# Patient Record
Sex: MALE | Race: WHITE | Employment: OTHER | URBAN - METROPOLITAN AREA
[De-identification: names, ages, dates, MRNs, and addresses within clinical notes are randomized per-mention and may not be internally consistent; named-entity substitution may affect disease eponyms.]

---

## 2017-08-29 ENCOUNTER — HOSPITAL ENCOUNTER (INPATIENT)
Facility: CLINIC | Age: 27
LOS: 1 days | Discharge: HOME | DRG: 885 | End: 2017-08-31
Attending: EMERGENCY MEDICINE | Admitting: PSYCHIATRY & NEUROLOGY
Payer: MEDICARE

## 2017-08-29 DIAGNOSIS — Z79.899 DRUG THERAPY: ICD-10-CM

## 2017-08-29 DIAGNOSIS — R46.89 AGGRESSION: ICD-10-CM

## 2017-08-29 DIAGNOSIS — F25.9 SCHIZOAFFECTIVE DISORDER, UNSPECIFIED TYPE (H): ICD-10-CM

## 2017-08-29 PROCEDURE — 99285 EMERGENCY DEPT VISIT HI MDM: CPT | Mod: Z6 | Performed by: EMERGENCY MEDICINE

## 2017-08-29 PROCEDURE — 99285 EMERGENCY DEPT VISIT HI MDM: CPT | Mod: 25 | Performed by: EMERGENCY MEDICINE

## 2017-08-29 NOTE — IP AVS SNAPSHOT
MRN:[id number]                      After Visit Summary   8/29/2017    Cosmo Salas    MRN: [id number]           Thank you!     Thank you for choosing [facility] for your care. Our goal is always to provide you with excellent care.        Patient Information     Date Of Birth          1990        Designated Caregiver       Most Recent Value    Caregiver    Will someone help with your care after discharge? yes    Name of designated caregiver aquilino (mother)    Phone number of caregiver [phone]    Caregiver address [address] [address]      About your hospital stay     You were admitted on:  August 30, 2017 You last received care in the:  [facility]    You were discharged on:  August 31, 2017       Who to Call     For medical emergencies, please call 911.  For non-urgent questions about your medical care, please call your primary care provider or clinic, [phone]          Attending Provider     Provider Specialty    Chris Fuentes MD Emergency Medicine    Evan, Vinay Knapp MD Psychiatry       Primary Care Provider Office Phone # Fax #    [place] Mateo Tamayo [phone]      Further instructions from your care team        Behavioral Discharge Planning and Instructions      Summary:  You were admitted on 8/29/2017  due to Psychotic Symptomology.  You were treated by Dr Vinay Gordon MD and discharged on 8/31/17 from [facility]      Principal Diagnosis: Schizophrenia Disorder       Health Care Follow-up Appointments:     [facility]  2:40pm Dr Tamayo  Address: [address] [place] [address]  Phone: [phone] fax: [phone]    Recommendations for Dr Tamayo: (please consider tapering pt off wellbutrin)     You were offered an appointment to see at therapist but declined at this time.  You report that your outpatient psychiatrist was recommending a referral to IOP but you have not  "accepted this and will discuss it further with your provider.    Attend all scheduled appointments with your outpatient providers. Call at least 24 hours in advance if you need to reschedule an appointment to ensure continued access to your outpatient providers.   Major Treatments, Procedures and Findings:  You were provided with: a psychiatric assessment and medication evaluation and/or management    Symptoms to Report: feeling more aggressive, mood getting worse or increase in auditory hallucinations    Early warning signs can include: increased depression or anxiety increased unusual thinking, such as paranoia or hearing voices    Safety and Wellness:  Take all medicines as directed.  Make no changes unless your doctor suggests them.      Follow treatment recommendations.  Refrain from alcohol and non-prescribed drugs.  If there is a concern for safety, call 911.    Resources:   Crisis Intervention: 753.870.7980 or 877-411-9451 (TTY: 928.518.7072).  Call anytime for help.  National [place] on Mental Illness (www.[place].bobby.org): 752.450.2398 or 812-644-1060.  Suicide Awareness Voices of Education (SAVE) (www.save.org): 837-121-CYCB (9230)  National Suicide Prevention Line (www.mentalhealthmn.org): 393-879-HHKN (5241)  Mental Health Consumer/Survivor Network of [place] (www.mhcsn.net): 148.732.1960 or 226-351-7509  Mental Health Association of [place] (www.mentalhealth.org): 687.805.7749 or 478-315-0650  Self- Management and Recovery Training., SMART-- Toll free: 701.878.4137  www.Carvoyant.org  [facility] Crisis ([facility]) Response - Adult [phone]  Text 4 Life: txt \"LIFE\" to 37664 for immediate support and crisis intervention    The treatment team has appreciated the opportunity to work with you.     If you have any questions or concerns our unit number is [phone]  You may be receiving a follow-up phone call within the next three days from a representative from behavioral health.    You have identified the " "best phone number to reach you as [phone]          Pending Results     No orders found from 8/27/2017 to 8/30/2017.            Admission Information     Date & Time Provider Department Dept. Phone    8/29/2017 Vinay Gordon MD [facility] [phone]      Your Vitals Were     Blood Pressure Pulse Temperature Respirations Height Weight    131/85 76 98.2  F (36.8  C) 86 1.892 m (6' 2.5\") 126.7 kg (279 lb 4.8 oz)    Pulse Oximetry BMI (Body Mass Index)                96% 35.38 kg/m2          MyChart Information     [employer] gives you secure access to your electronic health record. If you see a primary care provider, you can also send messages to your care team and make appointments. If you have questions, please call your primary care clinic.  If you do not have a primary care provider, please call [phone] and they will assist you.        Care EveryWhere ID     This is your Care EveryWhere ID. This could be used by other organizations to access your [facility] medical records  [id number]        Equal Access to Services     [facility] : Hadii tarsha Lobo, walarissa miramontes, qablanquita barba, shannon maria. So [facility] [phone].    ATENCIÓN: Si habla español, tiene a hoffman disposición servicios gratuitos de asistencia lingüística. Llame al [phone].    We comply with applicable federal civil rights laws and [place] laws. We do not discriminate on the basis of race, color, national origin, age, disability sex, sexual orientation or gender identity.               Review of your medicines      START taking        Dose / Directions    OLANZapine 5 MG tablet   Commonly known as:  zyPREXA   Used for:  Schizoaffective disorder, unspecified type (H)        Dose:  5 mg   Take 1 tablet (5 mg) by mouth 3 times daily as needed (hallucinations, and increased anxiety due to instrucive auditory hallucinations)   Quantity:  90 tablet   Refills:  0         CONTINUE these " medicines which may have CHANGED, or have new prescriptions. If we are uncertain of the size of tablets/capsules you have at home, strength may be listed as something that might have changed.        Dose / Directions    ARIPiprazole 5 MG tablet   Commonly known as:  ABILIFY   This may have changed:  how much to take   Used for:  Schizoaffective disorder, unspecified type (H)        Dose:  25 mg   Take 5 tablets (25 mg) by mouth daily   Quantity:  150 tablet   Refills:  0       sertraline 50 MG tablet   Commonly known as:  ZOLOFT   This may have changed:    - medication strength  - how much to take   Used for:  Aggression, Schizoaffective disorder, unspecified type (H)        Dose:  150 mg   Take 3 tablets (150 mg) by mouth daily   Quantity:  90 tablet   Refills:  0         CONTINUE these medicines which have NOT CHANGED        Dose / Directions    LISINOPRIL PO        Dose:  5 mg   Take 5 mg by mouth   Refills:  0       METFORMIN HCL PO        Dose:  500 mg   Take 500 mg by mouth daily (with breakfast)   Refills:  0       multivitamin, therapeutic Tabs tablet        Dose:  1 tablet   Take 1 tablet by mouth daily   Refills:  0       OMEGA-3 FISH OIL PO        Dose:  1 g   Take 1 g by mouth daily   Refills:  0       TOPAMAX PO        Dose:  25 mg   Take 25 mg by mouth At Bedtime   Refills:  0       WELLBUTRIN XL PO        Dose:  150 mg   Take 150 mg by mouth   Refills:  0         STOP taking     Risperidone 4 MG Tbdp           risperiDONE microspheres 25 MG injection   Commonly known as:  risperDAL CONSTA                Where to get your medicines      These medications were sent to [facility] [place], [place] - [address]  [address] [facility] [address] [address]     Phone:  [phone]     ARIPiprazole 5 MG tablet    OLANZapine 5 MG tablet    sertraline 50 MG tablet                Protect others around you: Learn how to safely use, store and throw away your medicines at  www.disposemymeds.org.             Medication List: This is a list of all your medications and when to take them. Check marks below indicate your daily home schedule. Keep this list as a reference.      Medications           Morning Afternoon Evening Bedtime As Needed    ARIPiprazole 5 MG tablet   Commonly known as:  ABILIFY   Take 5 tablets (25 mg) by mouth daily   Last time this was given:  25 mg on 8/31/2017  8:21 AM                                LISINOPRIL PO   Take 5 mg by mouth   Last time this was given:  5 mg on 8/31/2017  8:21 AM                                METFORMIN HCL PO   Take 500 mg by mouth daily (with breakfast)                                multivitamin, therapeutic Tabs tablet   Take 1 tablet by mouth daily                                OLANZapine 5 MG tablet   Commonly known as:  zyPREXA   Take 1 tablet (5 mg) by mouth 3 times daily as needed (hallucinations, and increased anxiety due to instrucive auditory hallucinations)   Last time this was given:  10 mg on 8/30/2017  1:33 PM                                OMEGA-3 FISH OIL PO   Take 1 g by mouth daily                                sertraline 50 MG tablet   Commonly known as:  ZOLOFT   Take 3 tablets (150 mg) by mouth daily   Last time this was given:  150 mg on 8/31/2017  8:21 AM                                TOPAMAX PO   Take 25 mg by mouth At Bedtime                                WELLBUTRIN XL PO   Take 150 mg by mouth   Last time this was given:  150 mg on 8/31/2017  8:21 AM

## 2017-08-29 NOTE — IP AVS SNAPSHOT
[address]    [address]    [place] [address]    Phone:  [phone]                                       After Visit Summary   8/29/2017    Cosmo Salas    MRN: [id number]           After Visit Summary Signature Page     I have received my discharge instructions, and my questions have been answered. I have discussed any challenges I see with this plan with the nurse or doctor.    ..........................................................................................................................................  Patient/Patient Representative Signature      ..........................................................................................................................................  Patient Representative Print Name and Relationship to Patient    ..................................................               ................................................  Date                                            Time    ..........................................................................................................................................  Reviewed by Signature/Title    ...................................................              ..............................................  Date                                                            Time

## 2017-08-30 PROBLEM — R44.0 AUDITORY HALLUCINATIONS: Status: ACTIVE | Noted: 2017-08-30

## 2017-08-30 LAB
AMPHETAMINES UR QL SCN: NEGATIVE
BARBITURATES UR QL: NEGATIVE
BENZODIAZ UR QL: NEGATIVE
CANNABINOIDS UR QL SCN: NEGATIVE
COCAINE UR QL: NEGATIVE
ETHANOL UR QL SCN: NEGATIVE
OPIATES UR QL SCN: NEGATIVE

## 2017-08-30 PROCEDURE — 99222 1ST HOSP IP/OBS MODERATE 55: CPT | Mod: AI | Performed by: PSYCHIATRY & NEUROLOGY

## 2017-08-30 PROCEDURE — 25000132 ZZH RX MED GY IP 250 OP 250 PS 637: Performed by: EMERGENCY MEDICINE

## 2017-08-30 PROCEDURE — 12400007 ZZH R&B MH INTERMEDIATE UMMC

## 2017-08-30 PROCEDURE — 80320 DRUG SCREEN QUANTALCOHOLS: CPT | Performed by: EMERGENCY MEDICINE

## 2017-08-30 PROCEDURE — 80307 DRUG TEST PRSMV CHEM ANLYZR: CPT | Performed by: EMERGENCY MEDICINE

## 2017-08-30 PROCEDURE — 93005 ELECTROCARDIOGRAM TRACING: CPT | Performed by: EMERGENCY MEDICINE

## 2017-08-30 PROCEDURE — 90791 PSYCH DIAGNOSTIC EVALUATION: CPT

## 2017-08-30 PROCEDURE — 25000132 ZZH RX MED GY IP 250 OP 250 PS 637: Performed by: PSYCHIATRY & NEUROLOGY

## 2017-08-30 PROCEDURE — A9270 NON-COVERED ITEM OR SERVICE: HCPCS | Mod: GY | Performed by: EMERGENCY MEDICINE

## 2017-08-30 PROCEDURE — 25000132 ZZH RX MED GY IP 250 OP 250 PS 637: Mod: GY | Performed by: EMERGENCY MEDICINE

## 2017-08-30 RX ORDER — OLANZAPINE 10 MG/1
10 TABLET ORAL 3 TIMES DAILY PRN
Status: DISCONTINUED | OUTPATIENT
Start: 2017-08-30 | End: 2017-08-30 | Stop reason: ALTCHOICE

## 2017-08-30 RX ORDER — HALOPERIDOL 5 MG/1
5 TABLET ORAL 3 TIMES DAILY PRN
Status: DISCONTINUED | OUTPATIENT
Start: 2017-08-30 | End: 2017-08-30 | Stop reason: ALTCHOICE

## 2017-08-30 RX ORDER — OLANZAPINE 10 MG/2ML
10 INJECTION, POWDER, FOR SOLUTION INTRAMUSCULAR 3 TIMES DAILY PRN
Status: DISCONTINUED | OUTPATIENT
Start: 2017-08-30 | End: 2017-08-30 | Stop reason: ALTCHOICE

## 2017-08-30 RX ORDER — BUPROPION HYDROCHLORIDE 150 MG/1
150 TABLET ORAL DAILY
Status: DISCONTINUED | OUTPATIENT
Start: 2017-08-30 | End: 2017-08-31 | Stop reason: HOSPADM

## 2017-08-30 RX ORDER — SERTRALINE HYDROCHLORIDE 100 MG/1
100 TABLET, FILM COATED ORAL DAILY
Status: DISCONTINUED | OUTPATIENT
Start: 2017-08-30 | End: 2017-08-30

## 2017-08-30 RX ORDER — ARIPIPRAZOLE 10 MG/1
20 TABLET ORAL DAILY
Status: DISCONTINUED | OUTPATIENT
Start: 2017-08-30 | End: 2017-08-30

## 2017-08-30 RX ORDER — OLANZAPINE 5 MG/1
5 TABLET ORAL 3 TIMES DAILY PRN
Status: DISCONTINUED | OUTPATIENT
Start: 2017-08-30 | End: 2017-08-31 | Stop reason: HOSPADM

## 2017-08-30 RX ORDER — LISINOPRIL 2.5 MG/1
5 TABLET ORAL DAILY
Status: DISCONTINUED | OUTPATIENT
Start: 2017-08-30 | End: 2017-08-31 | Stop reason: HOSPADM

## 2017-08-30 RX ORDER — HYDROXYZINE HYDROCHLORIDE 25 MG/1
25-50 TABLET, FILM COATED ORAL EVERY 4 HOURS PRN
Status: DISCONTINUED | OUTPATIENT
Start: 2017-08-30 | End: 2017-08-31 | Stop reason: HOSPADM

## 2017-08-30 RX ORDER — METFORMIN HCL 500 MG
500 TABLET, EXTENDED RELEASE 24 HR ORAL
Status: DISCONTINUED | OUTPATIENT
Start: 2017-08-30 | End: 2017-08-31 | Stop reason: HOSPADM

## 2017-08-30 RX ADMIN — BUPROPION HYDROCHLORIDE 150 MG: 150 TABLET, FILM COATED, EXTENDED RELEASE ORAL at 08:06

## 2017-08-30 RX ADMIN — SERTRALINE HYDROCHLORIDE 100 MG: 100 TABLET, FILM COATED ORAL at 08:07

## 2017-08-30 RX ADMIN — OLANZAPINE 10 MG: 10 TABLET, FILM COATED ORAL at 13:33

## 2017-08-30 RX ADMIN — ARIPIPRAZOLE 20 MG: 20 TABLET ORAL at 08:06

## 2017-08-30 RX ADMIN — LISINOPRIL 5 MG: 2.5 TABLET ORAL at 08:07

## 2017-08-30 RX ADMIN — METFORMIN HYDROCHLORIDE 500 MG: 500 TABLET, EXTENDED RELEASE ORAL at 17:17

## 2017-08-30 RX ADMIN — SERTRALINE HYDROCHLORIDE 50 MG: 50 TABLET ORAL at 14:48

## 2017-08-30 ASSESSMENT — ACTIVITIES OF DAILY LIVING (ADL)
GROOMING: INDEPENDENT
FALL_HISTORY_WITHIN_LAST_SIX_MONTHS: NO
RETIRED_COMMUNICATION: 0-->UNDERSTANDS/COMMUNICATES WITHOUT DIFFICULTY
AMBULATION: 0-->INDEPENDENT
DRESS: INDEPENDENT
BATHING: 0-->INDEPENDENT
RETIRED_EATING: 0-->INDEPENDENT
TRANSFERRING: 0-->INDEPENDENT
LAUNDRY: WITH SUPERVISION
SWALLOWING: 0-->SWALLOWS FOODS/LIQUIDS WITHOUT DIFFICULTY
ORAL_HYGIENE: INDEPENDENT
TOILETING: 0-->INDEPENDENT
COGNITION: 0 - NO COGNITION ISSUES REPORTED
DRESS: 0-->INDEPENDENT

## 2017-08-30 NOTE — PROGRESS NOTES
Admitted male for increased psychotic symptoms, ie. auditory hallucinations.  Pt. has been medication compliant outside the hospital, has had a recent increase in medications.  Pt. lives at home with his mother.  Pt. states that currently he feels restless, somewhat agitated, but is not experiencing auditory hallucinations at this time.  Pt. Is a voluntary pt.

## 2017-08-30 NOTE — H&P
"INPATIENT PSYCHIATRIC HISTORY AND PHYSICAL    Patient: Cosmo Salas  MRN: [id number]      DATE OF ADMISSION:  08/30/2017      DATE OF SERVICE:  08/30/2017      CHIEF COMPLAINT: \"I'm been having voices bother me\"        HISTORY OF PRESENT ILLNESS:  Cosmo is a 27 year old male with history of schizophrenia who presents to the hospital at the behest of [facility] for evaluation and management of his intrusive auditory hallucinations. He endorses worsening auditory hallucinations over the past couple weeks. He mentions that the voices occur at all times of the day, and are intrusive to his daily functioning. The voices are usually persecutory and commanding, often telling him to harm himself or other, but does not listen to them. He is not involved in much activities during the day, although the voices intrude his ability to relax and take walks, which he enjoys doing, thus he has been remaining indoors mostly. He often finds himself having active conversations with the voices, mentioning that they are sometimes the only outlet of communication. He finds himself having more aggressive behaviors, but not to the point of damaging property or hurting anyone (he's admitted to punching a hole in the wall in the past due to his anger). He mentions that his mood has been more reclusive, and isolative lately. He denies any suicidal ideation/intent/plan, and also denies any homicidal ideation and/or plan. He had some racing thoughts in the form of memories which he can't tell are real or not, including being ambushed by the police, being arrested, etc. He also has some delusional thoughts about celebrities, and thoughts of \"the whole world being raped,\" and did not elaborate much. He endorses anxiety throughout the day, which is related to his hallucinations. He denies any visual hallucinations. Mentions his Abilify was increased from 10 mg to 20 mg 2 weeks ago, and has not had any intolerable side effects. Denies any illicit " "substance use or alcohol use recently.     PSYCHIATRIC ROS:     -- Depressive episode: Atmotivation, isolation, low moods.   -- Jennifer: Flight of ideas, irritabilty.   -- Psychosis: Auditory hallucinations, paranoia, delusions  -- Anxiety: Worry symptoms during the day.   -- PTSD: Sleep trouble  -- OCD: None  -- Eating disorder: Feels like he eats more when he's down.      PAST PSYCHIATRIC HISTORY:     Prior diagnoses: schizophrenia, schizoaffective disorder  Past hospitalizations:   11/2014 at [facility] for suicidal ideation and plan. Stabilized on Risperdal and plans to follow up with Day treatment  5/2014 at [facility] due to similar presentation to current  1/2014 at [facility] due to homicidal ideation and punching holes in walls due to paranoia that neighbors were spying on him  2012 at [facility]  Suicide attempts: denies  Self-injurious behavior: denies  Violence: denies, although has had thoughts of violence in the past.  States that he is \"a nonviolent person\".  ECT: none  Past medications:   Risperidone 2 mg BID  Olanzapine  Quetiapine  Aripiprazole  Geodon  Invega  Haloperidol --> EPSE  depakote - did not tolerate oral dose  lurasidone 80 mg   Hydroxyzine PRN  zolpidem  CoQ10      CURRENT PSYCHIATRIC MEDICATIONS:   Current Facility-Administered Medications   Medication     buPROPion (WELLBUTRIN XL) 24 hr tablet 150 mg     lisinopril (PRINIVIL/ZESTRIL) tablet 5 mg     metFORMIN (GLUCOPHAGE-XR) 24 hr tablet 500 mg     hydrOXYzine (ATARAX) tablet 25-50 mg     OLANZapine (zyPREXA) tablet 10 mg    Or     OLANZapine (zyPREXA) injection 10 mg     sertraline (ZOLOFT) tablet 50 mg     [START ON 8/31/2017] sertraline (ZOLOFT) tablet 150 mg     [START ON 8/31/2017] ARIPiprazole (ABILIFY) tablet 25 mg            ALLERGIES:   No Known Allergies        PAST MEDICAL HISTORY:      L humerous ORIF after fall in 3/2014    REVIEW OF SYSTEMS:  Some sedation, fatigue. Otherwise, the Review of Systems is negative other than noted in the " "HPI      SUBSTANCE HISTORY:    Nicotine: never  Alcohol: Occational use, no habitual use.   Cannabis: none  Others: none    FAMILY PSYCHIATRIC HISTORY:  Possible MI in sister, unknown      SOCIAL HISTORY:    Upbringing: Parents  when he was in middle school, recently living with his mother in [place]. 2 sisters and a brother.  Relationships/Current Living Situation: lives with his mother  Education/Occupation:  Did not graduate HS but was there through 12th grade. Worked at [employer] as an  per chart review (5/2014).   Legal History: none  Abuse History: unknown      PHYSICAL EXAMINATION:  Exam was done by Chris Fuentes MD on 8/29/2017 (please refer to that note)      CURRENT VITAL SIGNS:  Vital signs:  Temp: 98.6  F (37  C) Temp src: Oral BP: 120/80 Pulse: 78 Heart Rate: 83 Resp: 16 SpO2: 96 % O2 Device: None (Room air)   Height: 189.2 cm (6' 2.5\") Weight: 126.7 kg (279 lb 4.8 oz)  Estimated body mass index is 35.38 kg/(m^2) as calculated from the following:    Height as of this encounter: 1.892 m (6' 2.5\").    Weight as of this encounter: 126.7 kg (279 lb 4.8 oz).            MENTAL STATUS EXAMINATION:      Appearance:  Tall white male, large build. Shaved head. Adequate hygiene. Laying in bed, no acute distress.   Attitude: calm, pleasant, cooperative   Eye Contact: adequate   Mood:  \"Ok right now\"   Affect:  Calm, mood congruent   Speech:   Soft in tone, regular in rate   Psychomotor Behavior: Unremarkable   Thought Process:  Linear, coherent  Associations: No loosening  Thought Content: Endorses AH, commanding type, and some persecutory. No VH. No SI/HI.   Insight:   Fair   Judgment: Fair   Oriented to:  Person, place , time   Attention Span and Concentration: Adequate for interview   Recent and Remote Memory: Adequate   Language: Intact   Fund of Knowledge: Intact   Muscle Strength and Tone: Normal  Gait and Station: Normal       DIAGNOSES:   Schizophrenia Disorder     ASSESSMENT: "   Cosmo is a 27 year old male with history of Schizophrenia who was admitted on a voluntary basis for further management for his worsening auditory hallucinations. He appears to have a reemergence/exacerbation of some paranoia and delusional content as well. He has tolerated Abilify increase recently (which was done by his outpatient provider), and discussed increasing that dose further, which he was agreeable with. His intrusive, emotionally salient memories lead him to distress and anxiety (and seem to precipitate some of the AH), thus might be mitigated by optimizing his Zoloft dose. Regarding his Wellbutrin, he might fair better with eventual decrease of this dose, since it might be activating for him, and worsening his anxiety.       PLAN:   1.)Psychosis:  - Increase Abilify to 25 mg (starting tmw)  - Increase Zoloft to 150 mg (starting today)  - Zyprexa 5 mg TID PRN for hallucination     -Labs: CBC,BMP, LFT.     Legal: Voluntary    Vinay Gordon MD    Spent  55  minutes on encounter, >50% of which was spent in counseling and/or coordination of care, consisting of history taking, symptoms overview, discussion about medications/side effects, and risks and benefits of medications, and answered any questions the patient had.

## 2017-08-30 NOTE — ED PROVIDER NOTES
"  History     Chief Complaint   Patient presents with     Hallucinations     hearing voices, racing thoughts and \"making connections\", pt says he is having trouble thinking. Recent med changes.     HPI  Cosmo Salas is a 27 year old male with a history of schizophrenia who presents with auditory hallucinations.  Cosmo tells me that he has been having racing thoughts and has had trouble thinking due to his voices.  His medications were recently changed and his Abilify was increased.  He has felt frustrated due to his auditory hallucinations and he has been increasingly aggressive.  He has thrown some things in the house and has at times appeared very aggressive.  Primary drinks occasional alcohol but denies any other drug use.  He denies any suicidal ideation.      PAST MEDICAL HISTORY:   Past Medical History:   Diagnosis Date     Schizencephaly (H)        PAST SURGICAL HISTORY:   Past Surgical History:   Procedure Laterality Date     OPEN REDUCTION INTERNAL FIXATION HUMERUS PROXIMAL Left        FAMILY HISTORY: No family history on file.    SOCIAL HISTORY:   Social History   Substance Use Topics     Smoking status: Never Smoker     Smokeless tobacco: Never Used     Alcohol use Yes      Comment: occassional       Patient's Medications   New Prescriptions    No medications on file   Previous Medications    MULTIVITAMIN, THERAPEUTIC (THERA-VIT) TABS    Take 1 tablet by mouth daily    OMEGA-3 FATTY ACIDS (OMEGA-3 FISH OIL PO)    Take 1 g by mouth daily    RISPERIDONE 4 MG TBDP    Take 4mg at bedtime for the next 3 weeks, then 3mg at bedtime for 1 week, then 2mg at bedtime for 1 week, then 1mg at bedtime for 1 week, then discontinue.    RISPERIDONE MICROSPHERES (RISPERDAL CONSTA) 25 MG INJECTION    Inject 2 mLs (25 mg) into the muscle every 14 days   Modified Medications    No medications on file   Discontinued Medications    No medications on file        No Known Allergies    I have reviewed the Medications, Allergies, " Past Medical and Surgical History, and Social History in the Epic system.    Review of Systems   All other systems reviewed and are negative.      Physical Exam   BP: (!) 142/96  Pulse: 84  Temp: 97.6  F (36.4  C)  Resp: 16  SpO2: 94 %  Physical Exam   Constitutional: He is oriented to person, place, and time. No distress.   HENT:   Head: Normocephalic and atraumatic.   Right Ear: External ear normal.   Left Ear: External ear normal.   Mouth/Throat: Oropharynx is clear and moist. No oropharyngeal exudate.   Eyes: Conjunctivae are normal. Pupils are equal, round, and reactive to light.   Neck: Normal range of motion. Neck supple.   Cardiovascular: Normal rate and intact distal pulses.    Pulmonary/Chest: Effort normal. No respiratory distress. He has no wheezes. He has no rales. He exhibits no tenderness.   Abdominal: Soft. There is no tenderness. There is no rebound and no guarding.   Musculoskeletal: Normal range of motion. He exhibits no edema or tenderness.   Neurological: He is alert and oriented to person, place, and time. No cranial nerve deficit. He exhibits normal muscle tone. Coordination normal.   Skin: Skin is warm and dry. No rash noted. He is not diaphoretic.   Psychiatric: He has a normal mood and affect. His behavior is normal. Thought content normal.   Nursing note and vitals reviewed.      ED Course     ED Course     Procedures             Critical Care time:  none             Labs Ordered and Resulted from Time of ED Arrival Up to the Time of Departure from the ED - No data to display         Assessments & Plan (with Medical Decision Making)   1.  Psychosis  2.  Agression    27-year-old male with schizophrenia with acute psychosis.  He is having increasing auditory hallucinations with increasing aggression.  I'm concerned for his increasing aggression despite his medication adjustment. He agrees to be admitted to psychiatry for further evaluation.        I have reviewed the nursing notes.    I have  reviewed the findings, diagnosis, plan and need for follow up with the patient.    New Prescriptions    No medications on file       Final diagnoses:   None       8/29/2017   [facility], [place], EMERGENCY DEPARTMENT     Chris Fuentes MD  08/30/17 0849

## 2017-08-30 NOTE — PROGRESS NOTES
08/30/17 1456   Patient Belongings   Did you bring any home meds/supplements to the hospital?  No   Patient Belongings cell phone/electronics;clothing;shoes;wallet;other (see comments)   Disposition of Belongings Patient locker and security   Belongings Search Yes   Clothing Search Yes   Second Staff Bryant     Belongings in Pt Locker: 6 pair of black socks, 4 pair of briefs, 6 shirts (2 white, 1 brown, 3 black), 3 pair of black shorts (ALL WITH STRING), 1 brown sweatshirt (with string), 1 pair of blue jeans, 1 pair of black shoes, 1 belt, headphones, phone , 1 iPhone, and wallet.    Belongings sent to Security: [employer] card - ending in [id number], [employer] Express card - [id number], Discover card - 0500, [employer] Visa card - 2552, [place] Visa debit card - 0932, [employer] Rewards Visa card - 8341, [employer] store card (account card).    A               Admission:  I am responsible for any personal items that are not sent to the safe or pharmacy.  [facility] is not responsible for loss, theft or damage of any property in my possession.    Signature:  _________________________________ Date: _______  Time: _____                                              Staff Signature:  ____________________________ Date: ________  Time: _____      2nd Staff person, if patient is unable/unwilling to sign:    Signature: ________________________________ Date: ________  Time: _____     Discharge:  [facility] has returned all of my personal belongings:    Signature: _________________________________ Date: ________  Time: _____                                          Staff Signature:  ____________________________ Date: ________  Time: _____

## 2017-08-31 VITALS
HEART RATE: 76 BPM | WEIGHT: 279.3 LBS | DIASTOLIC BLOOD PRESSURE: 85 MMHG | BODY MASS INDEX: 34.73 KG/M2 | TEMPERATURE: 98.2 F | OXYGEN SATURATION: 96 % | SYSTOLIC BLOOD PRESSURE: 131 MMHG | RESPIRATION RATE: 86 BRPM | HEIGHT: 75 IN

## 2017-08-31 LAB
ALBUMIN SERPL-MCNC: 4 G/DL (ref 3.4–5)
ALP SERPL-CCNC: 108 U/L (ref 40–150)
ALT SERPL W P-5'-P-CCNC: 67 U/L (ref 0–70)
ANION GAP SERPL CALCULATED.3IONS-SCNC: 8 MMOL/L (ref 3–14)
AST SERPL W P-5'-P-CCNC: 40 U/L (ref 0–45)
BASOPHILS # BLD AUTO: 0 10E9/L (ref 0–0.2)
BASOPHILS NFR BLD AUTO: 0.3 %
BILIRUB DIRECT SERPL-MCNC: 0.1 MG/DL (ref 0–0.2)
BILIRUB SERPL-MCNC: 0.4 MG/DL (ref 0.2–1.3)
BUN SERPL-MCNC: 15 MG/DL (ref 7–30)
CALCIUM SERPL-MCNC: 8.6 MG/DL (ref 8.5–10.1)
CHLORIDE SERPL-SCNC: 106 MMOL/L (ref 94–109)
CO2 SERPL-SCNC: 28 MMOL/L (ref 20–32)
CREAT SERPL-MCNC: 1.04 MG/DL (ref 0.66–1.25)
DIFFERENTIAL METHOD BLD: NORMAL
EOSINOPHIL # BLD AUTO: 0.3 10E9/L (ref 0–0.7)
EOSINOPHIL NFR BLD AUTO: 4.4 %
ERYTHROCYTE [DISTWIDTH] IN BLOOD BY AUTOMATED COUNT: 13.8 % (ref 10–15)
GFR SERPL CREATININE-BSD FRML MDRD: 84 ML/MIN/1.7M2
GLUCOSE SERPL-MCNC: 117 MG/DL (ref 70–99)
HCT VFR BLD AUTO: 49.4 % (ref 40–53)
HGB BLD-MCNC: 16.9 G/DL (ref 13.3–17.7)
IMM GRANULOCYTES # BLD: 0 10E9/L (ref 0–0.4)
IMM GRANULOCYTES NFR BLD: 0.3 %
LYMPHOCYTES # BLD AUTO: 1.7 10E9/L (ref 0.8–5.3)
LYMPHOCYTES NFR BLD AUTO: 26.9 %
MCH RBC QN AUTO: 31.2 PG (ref 26.5–33)
MCHC RBC AUTO-ENTMCNC: 34.2 G/DL (ref 31.5–36.5)
MCV RBC AUTO: 91 FL (ref 78–100)
MONOCYTES # BLD AUTO: 0.6 10E9/L (ref 0–1.3)
MONOCYTES NFR BLD AUTO: 10.1 %
NEUTROPHILS # BLD AUTO: 3.7 10E9/L (ref 1.6–8.3)
NEUTROPHILS NFR BLD AUTO: 58 %
NRBC # BLD AUTO: 0 10*3/UL
NRBC BLD AUTO-RTO: 0 /100
PLATELET # BLD AUTO: 218 10E9/L (ref 150–450)
POTASSIUM SERPL-SCNC: 4.4 MMOL/L (ref 3.4–5.3)
PROT SERPL-MCNC: 8 G/DL (ref 6.8–8.8)
RBC # BLD AUTO: 5.41 10E12/L (ref 4.4–5.9)
SODIUM SERPL-SCNC: 142 MMOL/L (ref 133–144)
WBC # BLD AUTO: 6.3 10E9/L (ref 4–11)

## 2017-08-31 PROCEDURE — A9270 NON-COVERED ITEM OR SERVICE: HCPCS | Mod: GY | Performed by: EMERGENCY MEDICINE

## 2017-08-31 PROCEDURE — 25000132 ZZH RX MED GY IP 250 OP 250 PS 637: Mod: GY | Performed by: PSYCHIATRY & NEUROLOGY

## 2017-08-31 PROCEDURE — 85025 COMPLETE CBC W/AUTO DIFF WBC: CPT | Performed by: PSYCHIATRY & NEUROLOGY

## 2017-08-31 PROCEDURE — 80076 HEPATIC FUNCTION PANEL: CPT | Performed by: PSYCHIATRY & NEUROLOGY

## 2017-08-31 PROCEDURE — 36415 COLL VENOUS BLD VENIPUNCTURE: CPT | Performed by: PSYCHIATRY & NEUROLOGY

## 2017-08-31 PROCEDURE — 25000132 ZZH RX MED GY IP 250 OP 250 PS 637: Mod: GY | Performed by: EMERGENCY MEDICINE

## 2017-08-31 PROCEDURE — A9270 NON-COVERED ITEM OR SERVICE: HCPCS | Mod: GY | Performed by: PSYCHIATRY & NEUROLOGY

## 2017-08-31 PROCEDURE — 80048 BASIC METABOLIC PNL TOTAL CA: CPT | Performed by: PSYCHIATRY & NEUROLOGY

## 2017-08-31 PROCEDURE — 99238 HOSP IP/OBS DSCHRG MGMT 30/<: CPT | Performed by: PSYCHIATRY & NEUROLOGY

## 2017-08-31 RX ORDER — OLANZAPINE 5 MG/1
5 TABLET ORAL 3 TIMES DAILY PRN
Qty: 90 TABLET | Refills: 0 | Status: ON HOLD | OUTPATIENT
Start: 2017-08-31 | End: 2018-06-01

## 2017-08-31 RX ORDER — ARIPIPRAZOLE 5 MG/1
25 TABLET ORAL DAILY
Qty: 150 TABLET | Refills: 0 | Status: ON HOLD | OUTPATIENT
Start: 2017-08-31 | End: 2018-06-01

## 2017-08-31 RX ADMIN — ARIPIPRAZOLE 25 MG: 15 TABLET ORAL at 08:21

## 2017-08-31 RX ADMIN — SERTRALINE HYDROCHLORIDE 150 MG: 50 TABLET ORAL at 08:21

## 2017-08-31 RX ADMIN — LISINOPRIL 5 MG: 2.5 TABLET ORAL at 08:21

## 2017-08-31 RX ADMIN — BUPROPION HYDROCHLORIDE 150 MG: 150 TABLET, FILM COATED, EXTENDED RELEASE ORAL at 08:21

## 2017-08-31 ASSESSMENT — ACTIVITIES OF DAILY LIVING (ADL)
GROOMING: INDEPENDENT
DRESS: INDEPENDENT
LAUNDRY: WITH SUPERVISION
ORAL_HYGIENE: INDEPENDENT

## 2017-08-31 NOTE — DISCHARGE SUMMARY
[facility], [place]   Psychiatric Discharge Summary      Cosmo Salas MRN# [id number]   Age: 27 year old YOB: 1990     Date of Admission:  8/29/2017  Date of Discharge:  08/31/17  Admitting Physician:  Vinay Gordon MD  Discharge Physician:  Vinay Gordon MD         Summary/Hospital Course/Disposition:   Summary: Cosmo is a 27 year old male with history of Schizophrenia who was admitted on a voluntary basis for further management for his worsening auditory hallucinations. He appears to have a reemergence/exacerbation of some paranoia and delusional content as well. His voices are frequently present throughout the day, and he is usually able to distract himself with activities/music, but they have been getting more intense, and had been triggered by intense/trauma associated memories. It was unclear if his intrusive memories were real or confabulated, since the patient could not discern if they were corroborated in truth himself. He had a recent change in Abilify 2 weeks ago by this outpatient provider (10 mg to 20 mg). He seemed to tolerate his Abilify, thus we discussed making a slight increase in that from 20 mg to 25 mg, which he was in agreement with. Due to his intrusive trauma memories seemingly precipitating some of the AH to return, I increased the Zoloft as well (from 100 mg to 150 mg), which he was in agreement with. We also discussed the possibility of Wellbutrin contributing to some heightening of his voices. A recommendation for his outpatient provider could be to possibly reduce (or taper off) on the Wellbutrin. His Wellbutrin was not changed due to changes already in his Zoloft and Abilify. Zyprexa was added at 5 mg TID PRN, only to be used during periods of heightened anxiety due to his AH. He was calm, cooperative, and pleasant during his hospital stay. He did not appear to actively respond to internal stimuli. He did endorse having intrusive  memories and some AH while hospitalized but was less intense and less frequent has before. CBC, BMP, and LFT was ordered (since the last record is from 2014) ,which was within range. He requested to leave, and since he has improvement in his positive symptoms, denied any suicidal or homicidal ideation/intent/plan, thus was not holdable and discharged him back home.     Med Changes: Zoloft increased from 100 mg to 150 mg. Abilify increased from 20 mg to 25 mg.     Disposition: Patient will return home, where he lives with his mother. He plans to have a follow up appointment soon with [facility] with his psychiatrist.          DIagnoses:   Schizophrenia Disorder          Labs:     Recent Results (from the past 24 hour(s))   CBC with platelets differential    Collection Time: 08/31/17  7:51 AM   Result Value Ref Range    WBC 6.3 4.0 - 11.0 10e9/L    RBC Count 5.41 4.4 - 5.9 10e12/L    Hemoglobin 16.9 13.3 - 17.7 g/dL    Hematocrit 49.4 40.0 - 53.0 %    MCV 91 78 - 100 fl    MCH 31.2 26.5 - 33.0 pg    MCHC 34.2 31.5 - 36.5 g/dL    RDW 13.8 10.0 - 15.0 %    Platelet Count 218 150 - 450 10e9/L    Diff Method Automated Method     % Neutrophils 58.0 %    % Lymphocytes 26.9 %    % Monocytes 10.1 %    % Eosinophils 4.4 %    % Basophils 0.3 %    % Immature Granulocytes 0.3 %    Nucleated RBCs 0 0 /100    Absolute Neutrophil 3.7 1.6 - 8.3 10e9/L    Absolute Lymphocytes 1.7 0.8 - 5.3 10e9/L    Absolute Monocytes 0.6 0.0 - 1.3 10e9/L    Absolute Eosinophils 0.3 0.0 - 0.7 10e9/L    Absolute Basophils 0.0 0.0 - 0.2 10e9/L    Abs Immature Granulocytes 0.0 0 - 0.4 10e9/L    Absolute Nucleated RBC 0.0    Basic metabolic panel    Collection Time: 08/31/17  7:51 AM   Result Value Ref Range    Sodium 142 133 - 144 mmol/L    Potassium 4.4 3.4 - 5.3 mmol/L    Chloride 106 94 - 109 mmol/L    Carbon Dioxide 28 20 - 32 mmol/L    Anion Gap 8 3 - 14 mmol/L    Glucose 117 (H) 70 - 99 mg/dL    Urea Nitrogen 15 7 - 30 mg/dL    Creatinine 1.04  0.66 - 1.25 mg/dL    GFR Estimate 84 >60 mL/min/1.7m2    GFR Estimate If Black >90 >60 mL/min/1.7m2    Calcium 8.6 8.5 - 10.1 mg/dL   Hepatic panel    Collection Time: 08/31/17  7:51 AM   Result Value Ref Range    Bilirubin Direct 0.1 0.0 - 0.2 mg/dL    Bilirubin Total 0.4 0.2 - 1.3 mg/dL    Albumin 4.0 3.4 - 5.0 g/dL    Protein Total 8.0 6.8 - 8.8 g/dL    Alkaline Phosphatase 108 40 - 150 U/L    ALT 67 0 - 70 U/L    AST 40 0 - 45 U/L            Consults:   None            Discharge Medications:        Review of your medicines      START taking       Dose / Directions    OLANZapine 5 MG tablet   Commonly known as:  zyPREXA   Used for:  Schizoaffective disorder, unspecified type (H)        Dose:  5 mg   Take 1 tablet (5 mg) by mouth 3 times daily as needed (hallucinations, and increased anxiety due to instrucive auditory hallucinations)   Quantity:  90 tablet   Refills:  0         CONTINUE these medicines which may have CHANGED, or have new prescriptions. If we are uncertain of the size of tablets/capsules you have at home, strength may be listed as something that might have changed.       Dose / Directions    ARIPiprazole 5 MG tablet   Commonly known as:  ABILIFY   This may have changed:  how much to take   Used for:  Schizoaffective disorder, unspecified type (H)        Dose:  25 mg   Take 5 tablets (25 mg) by mouth daily   Quantity:  150 tablet   Refills:  0       sertraline 50 MG tablet   Commonly known as:  ZOLOFT   This may have changed:    - medication strength  - how much to take   Used for:  Aggression, Schizoaffective disorder, unspecified type (H)        Dose:  150 mg   Take 3 tablets (150 mg) by mouth daily   Quantity:  90 tablet   Refills:  0         CONTINUE these medicines which have NOT CHANGED       Dose / Directions    LISINOPRIL PO        Dose:  5 mg   Take 5 mg by mouth   Refills:  0       METFORMIN HCL PO        Dose:  500 mg   Take 500 mg by mouth daily (with breakfast)   Refills:  0        "multivitamin, therapeutic Tabs tablet        Dose:  1 tablet   Take 1 tablet by mouth daily   Refills:  0       OMEGA-3 FISH OIL PO        Dose:  1 g   Take 1 g by mouth daily   Refills:  0       TOPAMAX PO        Dose:  25 mg   Take 25 mg by mouth At Bedtime   Refills:  0       WELLBUTRIN XL PO        Dose:  150 mg   Take 150 mg by mouth   Refills:  0         STOP taking          Risperidone 4 MG Tbdp           risperiDONE microspheres 25 MG injection   Commonly known as:  risperDAL CONSTA                Where to get your medicines      These medications were sent to [facility] [place], [place] - [address]  [address], [facility] [address]     Phone:  [phone]      ARIPiprazole 5 MG tablet     OLANZapine 5 MG tablet     sertraline 50 MG tablet                  Mental Status Examination:   Appearance:  Tall white male, large build. Shaved head. Adequate hygiene. Laying in bed, no acute distress.   Attitude: calm, pleasant, cooperative   Eye Contact: adequate   Mood:  \"Ok right now\"   Affect:  Calm, mood congruent   Speech:   Soft in tone, regular in rate   Psychomotor Behavior: Unremarkable   Thought Process:  Linear, coherent  Associations: No loosening  Thought Content: Endorses some minor AH. No VH. No SI/HI.   Insight:   Fair   Judgment: Fair   Oriented to:  Person, place , time   Attention Span and Concentration: Adequate for interview   Recent and Remote Memory: Adequate   Language: Intact   Fund of Knowledge: Intact   Muscle Strength and Tone: Normal  Gait and Station: Normal          Discharge Plan:   No discharge procedures on file.    1.) Discharged on the medications mentioned in \"Summary/Hospital Course\"  2. ) Discharge back home, and will follow up with his outpatient provider at [facility].     Vinay Gordon MD  [facility]    Spent  20  minutes on encounter, >50% of which was spent in counseling and/or coordination of care, " consisting of discussion of symptoms, progress, medication changes, side effects, and discharge issues. Answered any questions he had about medication changes.

## 2017-08-31 NOTE — PROGRESS NOTES
Pt isolative to room all shift, came out for dinner. No SI, SIB stated/observed.     08/30/17 4260   Behavioral Health   Hallucinations denies / not responding to hallucinations   Thinking poor concentration   Orientation person: oriented;place: oriented   Memory baseline memory   Insight poor   Judgement impaired   Eye Contact at examiner   Affect blunted, flat   Mood other (see comments)  (MORELIA)   Physical Appearance/Attire attire appropriate to age and situation   Hygiene neglected grooming - unclean body, hair, teeth   Suicidality other (see comments)  (none stated/observed)   Self Injury other (see comment)  (none stated/observed)   Elopement (N/A)   Activity withdrawn;isolative   Speech clear;coherent   Medication Sensitivity no stated side effects;no observed side effects   Psychomotor / Gait balanced;steady   Psycho Education   Type of Intervention 1:1 intervention   Response observes from a distance   Activities of Daily Living   Hygiene/Grooming independent   Oral Hygiene independent   Dress independent   Laundry with supervision   Room Organization independent   Behavioral Health Interventions   Psychotic Symptoms maintain safety precautions;monitor need to revise level of observation;maintain safe secure environment;reality orientation;simple, clear language;decrease environmental stimulation;redirection of intrusive behaviors;redirection of aggressive behaviors;encourage nutrition and hydration;encourage participation / independence with adls;provide emotional support;establish therapeutic relationship;build upon strengths;monitor need for prn medication;monitor confusion, memory loss, decision making ability and reorient / intervent as needed   Social and Therapeutic Interventions (Psychotic Symptoms) encourage socialization with peers;encourage effective boundaries with peers;encourage participation in therapeutic groups and milieu activities

## 2017-08-31 NOTE — PROGRESS NOTES
INITIAL PSYCHOSOCIAL ASSESSMENT AND NOTE  I have reviewed the chart met with the patient, and developed Care Plan.  Information for assessment was obtained from: electronic medical records and interview with pt.   PRESENTING PROBLEM: Pt is a 27 year old male with a hx of schizophrenia, brought in by the [facility] team to be evaluated for worsening auditory hallucinations that he feels are increasing paranoid thoughts.  Hallucinations are worse in the past few weeks.  He has been able to manage them generally.  Pt reports that he lives with his mother and is followed by outpatient psychiatry at [facility].   He has a hx of case management but none presently and he reports that if he wanted this he would call [facility].    Pt reports that he has struggled with mental illness since his teen years.  He is on SSDI due to MI. He stopped his medications due to their side effects which he described as weight gain, low energy.  He is in agreement to take medications as prescribed going forward.      The following areas have been assessed:  History of Mental Health and Chemical Dependency:   Past hospitalizations:   11/2014 at [facility] for suicidal ideation and plan. Stabilized on Risperdal and plans to follow up with Day treatment  5/2014 at [facility] due to similar presentation to current  1/2014 at [facility] due to homicidal ideation and punching holes in walls due to paranoia that neighbors were spying on him  2012 at [facility]    No Chemical Health treatment has been needed.      Living Situation: lives with mother. Feels well supported by family, brother takes him to appointments.    Significant Life Events (Illness, Abuse, Trauma, Death): denies hx of trauma or abuse.  Family Description (Constellation, Family Psychiatric History): Possible MI in sister, unknown  Financial Status: on SSDI and lives with mother.  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   Educational Background:  GED     Service History: none  Legal Issues: denies  Ethnic/Cultural Issues:  male  Spiritual Orientation: non spiritual non Jain per pt  Social Functioning (organizations, interests): likes to go out to eat and go to the movies    Current Treatment providers:   [facility] Dr Tamayo  Address: [address] [place] [address]  Phone: [phone]  Hx of temporary case management by CART (Community Action Response Team)  Social Service Assessment/Plan:   Pt was admitted late afternoon 8/30 on the unit and met with psychiatry.  Pt met with [facility].  Pt is being discharged next day 8/31/17.  He has an appointment same day as discharge date.  He declines therapy referral.  He may consider referral for case management and reports he knows how to pursue this.

## 2017-08-31 NOTE — DISCHARGE INSTRUCTIONS
Behavioral Discharge Planning and Instructions      Summary:  You were admitted on 8/29/2017  due to Psychotic Symptomology.  You were treated by Dr Vinay Gordon MD and discharged on 8/31/17 from [facility] to Home      Principal Diagnosis: Schizophrenia Disorder       Health Care Follow-up Appointments:     [facility]  2:40pm Dr Tamayo  Address: [address] [place] [address]  Phone: [phone] fax: [phone]    Recommendations for Dr Tamayo: (please consider tapering pt off wellbutrin)     You were offered an appointment to see at therapist but declined at this time.  You report that your outpatient psychiatrist was recommending a referral to [facility] but you have not accepted this and will discuss it further with your provider.    Attend all scheduled appointments with your outpatient providers. Call at least 24 hours in advance if you need to reschedule an appointment to ensure continued access to your outpatient providers.   Major Treatments, Procedures and Findings:  You were provided with: a psychiatric assessment and medication evaluation and/or management    Symptoms to Report: feeling more aggressive, mood getting worse or increase in auditory hallucinations    Early warning signs can include: increased depression or anxiety increased unusual thinking, such as paranoia or hearing voices    Safety and Wellness:  Take all medicines as directed.  Make no changes unless your doctor suggests them.      Follow treatment recommendations.  Refrain from alcohol and non-prescribed drugs.  If there is a concern for safety, call 911.    Resources:   Crisis Intervention: 348.157.8185 or 295-356-3896 (TTY: 125.327.4221).  Call anytime for help.  National [place] on Mental Illness (www.[place].bobby.org): 212.686.2528 or 935-418-6838.  Suicide Awareness Voices of Education (SAVE) (www.save.org): 272-265-RPQW (6850)  National Suicide Prevention Line (www.mentalhealthmn.org): 093-274-VYJP  "(4470)  Mental Health Consumer/Survivor Network of [place] (www.mhcsn.net): 065-236-2764 or 697-577-2917  Mental Health Association of [place] (www.mentalhealth.org): 569.520.5552 or 022-233-3026  Self- Management and Recovery Training., SMART-- Toll free: 664.349.6538  www.[employer]  [facility] Crisis (COPE) Response - Adult 704 713-3813  Text 4 Life: txt \"LIFE\" to 31105 for immediate support and crisis intervention    The treatment team has appreciated the opportunity to work with you.     If you have any questions or concerns our unit number is [phone]  You may be receiving a follow-up phone call within the next three days from a representative from behavioral health.    You have identified the best phone number to reach you as [phone]        "

## 2017-08-31 NOTE — PLAN OF CARE
"Problem: Psychotic Symptoms  Goal: Psychotic Symptoms  ..Pt. Will engage in a reality based conversation with staff. Pt. Will report absence of all cognitive impairment, ie. Auditory, visual, tactile hallucinations. Pt. s thoughts will be clear, absent of all delusional thought process. ..Pt. Will exhibit a stable mood. Pt. Will report an increase/decrease in symptoms. Pt. Will identify And practice healthy coping strategies. Pt. Will not engage in suicidal behavior, self-injurious behavior. Pt. Will not engage in threatening or violent behavior.   Outcome: Improving    08/31/17 1338   Psychotic Symptoms   Psychotic Symptoms Assessed all   Psychotic Symptoms Present none   Pt stated he feels ready for discharge. Pt denies SI/SIB/HI/Hallucinations. Pt took all belongings home. Discharge plan reviewed with pt. Pt unable to make appt today but stated he will call his therapist and reschedule. Pt lives with mom and feels she is \"somewhat\" supportive. Pt feels brother is supportive. Pt rates anxiety 5/10 and \"depression is probably higher than I think.\"  Pt rode stationary bike on unit this morning and stated it makes him feel better.       "

## 2017-08-31 NOTE — PLAN OF CARE
Problem: General Plan of Care (Inpatient Behavioral)  Goal: Individualization/Patient Specific Goal (IP Behavioral)  ..Illness Management Recovery model: Objectives    Patient will identify reason(s) for hospitalization from their perspective.  Patient will identify a minimum of three goals for discharge.  Patient will identify a minimum of three triggers that may increase their symptoms.  Patient will identify a minimum of three coping skills they can do to stay well.   Patient will identify their support system to demonstrate readiness for discharge.   The patient completed the reasons for admit and goals for discharge in the personal plan of care.   Reasons for admit:  1)  Hearing voices / Paranoia  2)  depression  3)  Aggression (not toward others)  4)  Feeling alone     Goals for discharge:  1)  Less voices  2)  More comfortable / less depressed  3)  Take care of self

## 2017-08-31 NOTE — PLAN OF CARE
Problem: General Plan of Care (Inpatient Behavioral)  Goal: Team Discussion  Team Plan:   BEHAVIORAL TEAM DISCUSSION     Participants: Dr. Gordon (via notes), Kelli CAPPS  Progress: new admit, admitted due to worsening auditory hallucinations   Continued Stay Criteria/Rationale: new admit, needs stabilization, long hx of hallucinations, has been maintained outpatient with family support but decompensation  requiring acute level of care.  Medical/Physical: see chart  Precautions:   Behavioral Orders   Procedures     Code 1 - Restrict to Unit     Routine Programming       As clinically indicated     Status 15       Every 15 minutes.     Plan: assess, monitor and stabilize.  Rationale for change in precautions or plan: new admit

## 2017-08-31 NOTE — PLAN OF CARE
Problem: Psychotic Symptoms  Goal: Social and Therapeutic (Psychotic Symptoms)  ..Pt. Engages appropriately with staff and other pts. Pt. Responds to redirection as indicated. Pt. Attends and engages in group in a therapeutic manner. Pt. Able to make requests of staff for daily needs, concerns, questions, medications. Pt. Completes ADL s Without difficulty.      Pt did not attend any OT groups today. Encourage participation and evaluate level of function. Patient will be given a Self Assessment form. OT staff will explain the value of including them in their treatment plan and offer options to meet their needs and identified goals.

## 2017-09-01 NOTE — PROGRESS NOTES
09/01/17 0700   General Information   Has Not Attended OT as of: 08/31/17   General Observation/Plan   General Observations/Plan See Comments     Pt did not attend any OT groups. Has been discharged. No further action.

## 2018-06-01 ENCOUNTER — HOSPITAL ENCOUNTER (INPATIENT)
Facility: CLINIC | Age: 28
LOS: 3 days | Discharge: LEFT BEFORE DISCHARGE | DRG: 885 | End: 2018-06-04
Attending: PSYCHIATRY & NEUROLOGY | Admitting: PSYCHIATRY & NEUROLOGY
Payer: MEDICARE

## 2018-06-01 ENCOUNTER — TRANSFERRED RECORDS (OUTPATIENT)
Dept: HEALTH INFORMATION MANAGEMENT | Facility: CLINIC | Age: 28
End: 2018-06-01

## 2018-06-01 DIAGNOSIS — F20.9 SCHIZOPHRENIA, UNSPECIFIED TYPE (H): Primary | ICD-10-CM

## 2018-06-01 PROCEDURE — A9270 NON-COVERED ITEM OR SERVICE: HCPCS | Mod: GY | Performed by: STUDENT IN AN ORGANIZED HEALTH CARE EDUCATION/TRAINING PROGRAM

## 2018-06-01 PROCEDURE — 25000132 ZZH RX MED GY IP 250 OP 250 PS 637: Mod: GY | Performed by: STUDENT IN AN ORGANIZED HEALTH CARE EDUCATION/TRAINING PROGRAM

## 2018-06-01 PROCEDURE — 12400007 ZZH R&B MH INTERMEDIATE UMMC

## 2018-06-01 RX ORDER — LISINOPRIL 2.5 MG/1
5 TABLET ORAL DAILY
Status: DISCONTINUED | OUTPATIENT
Start: 2018-06-02 | End: 2018-06-04 | Stop reason: HOSPADM

## 2018-06-01 RX ORDER — TOPIRAMATE 25 MG/1
25 TABLET, FILM COATED ORAL AT BEDTIME
Status: DISCONTINUED | OUTPATIENT
Start: 2018-06-01 | End: 2018-06-04

## 2018-06-01 RX ORDER — ACETAMINOPHEN 325 MG/1
650 TABLET ORAL EVERY 4 HOURS PRN
Status: DISCONTINUED | OUTPATIENT
Start: 2018-06-01 | End: 2018-06-04 | Stop reason: HOSPADM

## 2018-06-01 RX ORDER — METFORMIN HCL 500 MG
500 TABLET, EXTENDED RELEASE 24 HR ORAL
Status: DISCONTINUED | OUTPATIENT
Start: 2018-06-02 | End: 2018-06-04 | Stop reason: HOSPADM

## 2018-06-01 RX ORDER — TRAZODONE HYDROCHLORIDE 50 MG/1
50 TABLET, FILM COATED ORAL
Status: DISCONTINUED | OUTPATIENT
Start: 2018-06-01 | End: 2018-06-04 | Stop reason: HOSPADM

## 2018-06-01 RX ORDER — ZIPRASIDONE HYDROCHLORIDE 20 MG/1
20 CAPSULE ORAL 2 TIMES DAILY
Status: ON HOLD | COMMUNITY
End: 2018-06-04

## 2018-06-01 RX ORDER — METFORMIN HCL 500 MG
500 TABLET, EXTENDED RELEASE 24 HR ORAL
COMMUNITY

## 2018-06-01 RX ORDER — OLANZAPINE 10 MG/1
10 TABLET ORAL
Status: DISCONTINUED | OUTPATIENT
Start: 2018-06-01 | End: 2018-06-04 | Stop reason: HOSPADM

## 2018-06-01 RX ORDER — MULTIVITAMIN,THERAPEUTIC
1 TABLET ORAL DAILY
Status: DISCONTINUED | OUTPATIENT
Start: 2018-06-02 | End: 2018-06-04 | Stop reason: HOSPADM

## 2018-06-01 RX ORDER — ALUMINA, MAGNESIA, AND SIMETHICONE 2400; 2400; 240 MG/30ML; MG/30ML; MG/30ML
30 SUSPENSION ORAL EVERY 4 HOURS PRN
Status: DISCONTINUED | OUTPATIENT
Start: 2018-06-01 | End: 2018-06-04 | Stop reason: HOSPADM

## 2018-06-01 RX ORDER — OLANZAPINE 10 MG/2ML
10 INJECTION, POWDER, FOR SOLUTION INTRAMUSCULAR
Status: DISCONTINUED | OUTPATIENT
Start: 2018-06-01 | End: 2018-06-04 | Stop reason: HOSPADM

## 2018-06-01 RX ORDER — HYDROXYZINE HYDROCHLORIDE 25 MG/1
25 TABLET, FILM COATED ORAL EVERY 4 HOURS PRN
Status: DISCONTINUED | OUTPATIENT
Start: 2018-06-01 | End: 2018-06-04 | Stop reason: HOSPADM

## 2018-06-01 RX ORDER — CHLORAL HYDRATE 500 MG
1 CAPSULE ORAL DAILY
Status: DISCONTINUED | OUTPATIENT
Start: 2018-06-02 | End: 2018-06-04 | Stop reason: HOSPADM

## 2018-06-01 RX ORDER — ZIPRASIDONE HYDROCHLORIDE 20 MG/1
20 CAPSULE ORAL 2 TIMES DAILY
Status: DISCONTINUED | OUTPATIENT
Start: 2018-06-01 | End: 2018-06-02

## 2018-06-01 RX ADMIN — TOPIRAMATE 25 MG: 25 TABLET, FILM COATED ORAL at 22:02

## 2018-06-01 RX ADMIN — ZIPRASIDONE HCL 20 MG: 20 CAPSULE ORAL at 22:02

## 2018-06-01 ASSESSMENT — ACTIVITIES OF DAILY LIVING (ADL)
LAUNDRY: WITH SUPERVISION
ORAL_HYGIENE: INDEPENDENT
GROOMING: HANDWASHING;SHOWER;INDEPENDENT
DRESS: STREET CLOTHES;INDEPENDENT

## 2018-06-01 NOTE — IP AVS SNAPSHOT
[address]    [address]    [place] [address]    Phone:  [phone]                                       After Visit Summary   6/1/2018    Cosmo Salas    MRN: [id number]           After Visit Summary Signature Page     I have received my discharge instructions, and my questions have been answered. I have discussed any challenges I see with this plan with the nurse or doctor.    ..........................................................................................................................................  Patient/Patient Representative Signature      ..........................................................................................................................................  Patient Representative Print Name and Relationship to Patient    ..................................................               ................................................  Date                                            Time    ..........................................................................................................................................  Reviewed by Signature/Title    ...................................................              ..............................................  Date                                                            Time

## 2018-06-01 NOTE — H&P
"    -----------------------------------------------------------------------------------------------------------  Psychiatry History & Physical      Cosmo Salas MRN# [id number]   Age: 28 year old YOB: 1990     Date of Admission: June 1, 2018 interviewed at 7:00 PM            Contacts:   Primary Outpatient Psychiatrist: Matt Tamayo MD; [facility]  Primary Physician: Sam Emmons, [facility]  Therapist: [facility] CM: Akhil Contreras [facility]  Probation/: None  Family: Leonor Salas, Mother ([phone])         Chief Complaint:   Difficulty sleeping    History is obtained from the patient and electronic health record         History of Present Illness:   Cosmo Salas is a 28 year old  male with a significant past psychiatric history of  schizophrenia who presents with difficulty sleeping and increased disorganization.    From [facility] ED Note: \"Mr. Salas is a 28 y.o. male who presents today for evaluation of auditory hallucinations. Reportedly, he says that the \"voices are always there\". For the past 2 days, however they have been more prevalent per patient's report. He said that they don't tell him to harm himself and/or they don't exactly give him any directions. He said that they talk to him about people from the past and mythologies. Sometimes, he said: \"We get stuck on certain people and places\". More than anything he said that these voices are very frustrating to him. Patient denies feeling suicidal and/or homicidal. In part, he says that he knows that the voices are not real to others, but he said: \"They are real to me\". Patient said that he is eating ok but his sleep has been disturbed since the onset of the voices. Patient said that he had been off his medications for 4 weeks including the Safaris (sp?). Then, when he started again to be symptomatic as described above, he said that he was put on Geodon, 1 pill daily for a " "week and then 2 pills regularly after that. He said that the Geodon strangely enough is not helping his symptoms to decrease. He suspects that he needs a medication adjustment and hopes that the voices remit.\"    Patient Interview: Patient is extremely difficult historian due to significant disorganization.  He has been unable to sleep for the past 3 days.  He states his symptoms have gotten progressively worse over the past 4 weeks since he was started on Geodon.  Previous to this per chart review he appears to have been on Invega and this was apparently discontinued prior to initiation of Geodon.  Prior to starting Geodon he was off all his psychotropic medications for some period of time, possibly a month.  Auditory hallucinations began to worsen about a week ago, are multiple in number and apparently talked to him about the events that have happened in his past as well as additional \"mythologies\" that appear to be to patient's understanding alternative lives he has led.  He also reports that in the weeks leading up to the voices worsening he has felt increasingly \"spacey\" which he thinks may be due to Topamax.  Patient further endorses increasing anxiety and frustration due to inability to sleep and worsened auditory hallucinations.  He denies paranoia at this time however and felt safe both at home and in the hospital.  He does endorse thought broadcasting and thought insertion, but is unclear if this is a long-standing belief for him or acutely worsened in the current episode.  Patient further endorses worsening disinterest and normal activities that he enjoys but does not feel hopeless or helpless.  Energy level has been somewhat increased in the sense that he is not sleeping, and his appetite is normal.  He denies any suicidal ideation, has not engaged in self-injurious behaviors and he does not have any thoughts of hurting other people.    Cosmo Salas's goals of this hospitalization are to get some " "sleep.         Psychiatric History:   Past Diagnoses: Schizophrenia, Schizoaffective Disorder  Past Hospitalizations: 8/2017 [facility], 11/2014, 5/2014, 1/2014 [facility], 2012 [facility]  Prior ECT: None  Prior use of Psychotropic Medication: Risperidone, Olanzapine, Quetiapine, Aripiprazole, Ziprasidone, Invega, Haloperidol (EPSE), Depakote (not tolerated), Lurasidone, Hydroxyzine, Zolpidem  Court Commitment: None  Past Suicide Attempt: None  Self-injurious Behavior: None         Substance Use History:   Patient denies substance use history including tobacco, marijuana, methamphetamine, cocaine, heroin.  He states he is a social drinker and drinks an occasional beer here and there.  It should be noted that patient stated in \"previous methodologies\" he may have used any number of substances so it is unclear at this time if he truly has no past history of substance use.         Psychiatric Review of Systems:   Depression:   Reports: Anhedonia, sleeplessness  Denies: Feelings of hopelessness and helplessness, decreased energy, ruminations and feelings of guilt. Denies suicidal ideation.    Anxiety:   Reports: Worsened anxiety  Denies:     Jennifer:   Reports: Sleeplessness  Denies: No history of jennifer    Psychosis:   Reports: Auditory hallucinations, delusions, thought insertion, thought broadcasting  Denies: VH, paranoia,  ideas of reference.    PTSD: Negative for history of trauma and nightmares  OCD: Negative for checking and counting  Eating Disorder: Negative for binging, purging and restriction  No history of ADD/ADHD         Medical Review of Systems:   The Review of Systems is negative other than noted in the HPI          Past Medical History     Past Medical History:   Diagnosis Date     Depressive disorder      Hypertension      Schizencephaly (H)      No History of: hepatitis, HIV, head trauma with or without loss of consciousness and seizures         Medications:     Prescriptions Prior to Admission " "  Medication Sig Dispense Refill Last Dose     LISINOPRIL PO Take 5 mg by mouth   5/31/2018 at Unknown time     METFORMIN HCL PO Take 500 mg by mouth daily (with breakfast)   5/31/2018 at Unknown time     multivitamin, therapeutic (THERA-VIT) TABS Take 1 tablet by mouth daily   6/1/2018 at Unknown time     Omega-3 Fatty Acids (OMEGA-3 FISH OIL PO) Take 1 g by mouth daily   5/31/2018 at Unknown time     sertraline (ZOLOFT) 50 MG tablet Take 3 tablets (150 mg) by mouth daily 90 tablet 0 5/31/2018 at Unknown time     Topiramate (TOPAMAX PO) Take 25 mg by mouth At Bedtime   5/31/2018 at Unknown time     ziprasidone (GEODON) 20 MG capsule Take 20 mg by mouth 2 times daily   5/31/2018 at Unknown time            Allergies:   No Known Allergies   Nickel          Family History:    Patient reports his sister had an admission at some point with \"schizophrenic symptoms\" but improved and has not had a recurrence.        Social History   Taken from H/P to [facility] 8/2017 with updates  Upbringing: Parents  when he was in middle school, recently living with his mother in [place]. 2 sisters and a brother and half brother  Relationships/Current Living Situation: lives with his mother  Education/Occupation:  Did not graduate HS but was there through 12th grade. Worked at [employer] as an  per chart review (5/2014). Currently on disability.  Legal History: none  Abuse History:Denies         Labs:   From [facility] ED 6/1/2018:    UTox: Negative for Alcohol, Amphetamines, Cocaine, Methadone, Opiates, Oxycodone, THC  Acetaminophen: <3 ug/mL    Creatinine: 0.99  Est GFR: >60  Calcium: 9.7  Sodium: 137  Potassium: 4.3  Blood Urea Nitrogen: 25  Chloride: 108  CO2: 21  Anion Gap: 8  Glucose: 135  WBC: 10.2  RBC: 5.03  Hgb: 15.9  Hematocrit: 45.1  Plt: 250         Psychiatric Examination:   There were no vitals taken for this visit.    Appearance:  awake, alert, dressed in hospital scrubs, appeared as age " stated, moderately obese and slightly unkempt  Attitude:  cooperative and pleasant  Eye Contact:  fair  Mood:  pleasant  Affect:  Mildly blunted, intermittently smiles or laughs at questions inappropriately  Speech:  increased speech latency  Psychomotor Behavior:  no evidence of tardive dyskinesia, dystonia, or tics  Thought Process:  disorganized and evidence of thought blocking present  Associations:  no loose associations  Thought Content:  no evidence of suicidal ideation or homicidal ideation, auditory hallucinations present, no visual hallucinations present and patient appears to be responding to internal stimuli  Insight:  fair  Judgment:  fair  Oriented to:  time, person, and place  Attention Span and Concentration:  fair  Recent and Remote Memory:  limited  Language: communicates coherently in conversational context  Fund of Knowledge: low-normal  Muscle Strength and Tone: Not formally assessed  Gait and Station: Normal         Physical Examination:   Please refer to note by Abe Jimenez MD, dated 6/1/2018 for details of physical exam.         Assessment:   Cosmo Salas is a 28 year old male with a history of schizophrenia who presented to [facility] due to worsening disorganization and auditory hallucinations with sleeplessness. The patient's last psychiatric hospitalization was in October of 2017.  The patient is currently followed by Matt Tamayo MD. There is no definitive family history of mental illness. Current psychosocial stressors include worsening disorganization and auditory hallucinations with poor sleep which has led to patient not leaving the house. The patient denies any drug abuse or self injurious behaviors. The MSE is notable for pleasant young man who is quite disorganized and delusional with worsening auditory hallucinations but no suicidal or homicidal ideation. The patient's reported symptoms of disorganization, delusions, auditory hallucinations and anhedonia are  consistent with historic diagnosis of schizophrenia.  PTA medications were continued at time of admission. Given that he is quite disorganized, patient warrants inpatient psychiatric hospitalization to maintain his safety. Disposition pending clinical stabilization, medication optimization and development of an appropriate discharge plan.  It is entirely possible that if patient is able to get some sleep over the weekend, he could potentially discharge within the next few days with close outpatient follow-up.      Plan   Patient to be staffed in AM by Dr. Muller.   Principal Diagnosis: Schizophrenia  Medications:     New:  Haldol 2mg now, 5mg hs starting tomorrow evening. Discussed risks (including EPS and TD) benefits and alternatives   Haldol 5mg PRN agitation q6  Ativan 2mg PRN anxiety / agitation q6   Trazodone 50 mg at bedtime as needed for sleep may repeat ×1  Olanzapine 10 mg PO/IM Q2H PRN for agitation  Hydroxyzine 25 mg every 4 hours as needed for anxiety  Mylanta ES/Maalox ES 30 mL every 4 hours as needed for indigestion  Acetaminophen 650 mg every 4 hours as needed for mild pain    Continue:  Fish oil 1 g by mouth daily  Lisinopril 5 mg by mouth daily  Metformin 500 mg by mouth daily with breakfast  Multivitamin 1 tablet by mouth daily  Sertraline 150 mg by mouth daily  Topiramate 25 mg by mouth at bedtime    Discontinue:  Ziprasidone 20 mg by mouth twice daily    Laboratory/Imaging: Folate, hematocrit, lipid panel, TSH with free T4 reflex, UA with microscopic reflex to culture, vitamin B12    Consults: None    Patient will be treated in therapeutic milieu with appropriate individual and group therapies as described.    Secondary psychiatric diagnoses of concern this admission: Anxiety  Plan: As above    Medical diagnoses to be addressed this admission:   # Hypertension  - Lisinopril 5 mg by mouth daily    Consults: IM consult for routine medical evaluation. Assistance is appreciated.    Relevant  psychosocial stressors: Severe and persistent mental illness, poor sleep, inability to work    Legal Status: Voluntary    Safety Assessment:   Checks: Status 30  Precautions: None  Pt has not required locked seclusion or restraints in the past 24 hours to maintain safety, please refer to RN documentation for further details.       The risks, benefits, alternatives and side effects have been discussed and are understood by the patient and other caregivers.       Anticipated Disposition/Discharge Date: Unknown at this time. Pending further clinical evaluation and stabilization.    Attestation:  Patient has been seen and evaluated by me,  Aurelio Keating MD    Attestation:  I, Angel Moore, have personally performed an examination of this patient and I have reviewed the resident's documentation.  I have edited the note to reflect all relevant changes.  I have discussed this patient with the house staff on 6/2/2018.  I agree with resident findings and plan in yesterdays resident H&P.  I have reviewed all vitals and laboratory findings.      I certifiy that the inpatient services were ordered in accordance with the Medicare regulations governing the order. This includes certification that hospital inpatient services are reasonable and necessary and in the case of services not specified as inpatient-only under 42 .22(n), that they are appropriately provided as inpatient services in accordance with the 2-midnight benchmark under 42 .3(e).     The reason for inpatient status is Acute Psychosis.    Angel Moore MD

## 2018-06-01 NOTE — IP AVS SNAPSHOT
MRN:[id number]                      After Visit Summary   6/1/2018    Cosmo Salas    MRN: [id number]           Thank you!     Thank you for choosing [facility] for your care. Our goal is always to provide you with excellent care.        Patient Information     Date Of Birth          1990        Designated Caregiver       Most Recent Value    Caregiver    Will someone help with your care after discharge? no      About your hospital stay     You were admitted on:  June 1, 2018 You last received care in the:  [facility]    You were discharged on:  June 4, 2018       Who to Call     For medical emergencies, please call 911.  For non-urgent questions about your medical care, please call your primary care provider or clinic, [phone]          Attending Provider     Provider Specialty    Angel Moore MD Psychiatry       Primary Care Provider Office Phone # Fax #    Natalee Tamayo [phone]      Further instructions from your care team        Behavioral Discharge Planning and Instructions      Summary:  You were admitted on 6/1/2018  due to Disorganized Thinking/Behaviors and Psychotic Symptomology.  You were treated by Dr. Angel Moore MD and discharged on 6/4/18 from [facility]. You are being discharged Against Medical Advice because the treatment team would prefer you stay longer for continued stabilization.        Principal Diagnosis: Schizophrenia      Health Care Follow-up Appointments:   Matt Tamayo MD; Thursday June 14 @ 3:20pm  [facility] [address] [place], [place]  [phone]  Health Unit Coordinator has faxed discharge summary and instructions to [phone].    Primary Physician: Sam Emmons, [facility]  Therapist: [facility] CM: Josefina Villegas [facility]    Attend all scheduled appointments with your outpatient providers. Call at least 24 hours in advance if you need to reschedule an  "appointment to ensure continued access to your outpatient providers.   Major Treatments, Procedures and Findings:  You were provided with: a psychiatric assessment, assessed for medical stability, medication evaluation and/or management and group therapy    Symptoms to Report: feeling more aggressive, increased confusion, losing more sleep, mood getting worse or thoughts of suicide    Early warning signs can include: increased depression or anxiety sleep disturbances increased thoughts or behaviors of suicide or self-harm  increased unusual thinking, such as paranoia or hearing voices    Safety and Wellness:  Take all medicines as directed.  Make no changes unless your doctor suggests them.      Follow treatment recommendations.  Refrain from alcohol and non-prescribed drugs.  If there is a concern for safety, call 911.    Resources:   Crisis Intervention: 671.117.7481 or 037-902-2872 (TTY: 356.779.8441).  Call anytime for help.  National [place] [employer] Mental Illness (www.[place].bobby.org): 386.603.8070 or 823-123-1751.  Mental Health Consumer/Survivor Network of [place] (www.mhcsn.net): 170.195.7566 or 203-174-1475  Mental Health Association of [place] (www.mentalhealth.org): 337.485.1410 or 814-757-5694  [facility] Crisis (COPE) Response - Adult [phone]  Text 4 Life: txt \"LIFE\" to 13570 for immediate support and crisis intervention  Crisis text line: Text \"[place]\" to 938426. Free, confidential, 24/7.    The treatment team has appreciated the opportunity to work with you.     If you have any questions or concerns our unit number is [phone].  You may be receiving a follow-up phone call within the next three days from a representative from behavioral health.    You have identified the best phone number to reach you as [phone]          Pending Results     No orders found from 5/30/2018 to 6/2/2018.            Admission Information     Date & Time Provider Department Dept. Phone    6/1/2018 Angel Moore MD [facility] " "[phone]      Your Vitals Were     Blood Pressure Pulse Temperature Respirations Height Weight    133/83 81 98.2  F (36.8  C) (Tympanic) 16 1.905 m (6' 3\") 135.6 kg (299 lb)    BMI (Body Mass Index)                   37.37 kg/m2           [facility] Information     [facility] gives you secure access to your electronic health record. If you see a primary care provider, you can also send messages to your care team and make appointments. If you have questions, please call your primary care clinic.  If you do not have a primary care provider, please call [phone] and they will assist you.        Care EveryWhere ID     This is your Care EveryWhere ID. This could be used by other organizations to access your [facility] medical records  [id number]        Equal Access to Services     JENNIFER REHMAN : Angel Lobo, swapna miramontes, shannon narvaez. So [facility] [phone].    ATENCIÓN: Si habla español, tiene a hoffman disposición servicios gratuitos de asistencia lingüística. Llame al [phone].    We comply with applicable federal civil rights laws and [place] laws. We do not discriminate on the basis of race, color, national origin, age, disability, sex, sexual orientation, or gender identity.               Review of your medicines      START taking        Dose / Directions    haloperidol 5 MG tablet   Commonly known as:  HALDOL        Dose:  5 mg   Take 1 tablet (5 mg) by mouth every evening   Quantity:  30 tablet   Refills:  0       traZODone 50 MG tablet   Commonly known as:  DESYREL        Dose:  50 mg   Take 1 tablet (50 mg) by mouth nightly as needed for sleep   Quantity:  30 tablet   Refills:  0         CONTINUE these medicines which have NOT CHANGED        Dose / Directions    LISINOPRIL PO        Dose:  5 mg   Take 5 mg by mouth   Refills:  0       metFORMIN 500 MG 24 hr tablet   Commonly known as:  GLUCOPHAGE-XR        Dose:  500 mg   Take 500 mg " by mouth daily (with breakfast)   Refills:  0       multivitamin, therapeutic Tabs tablet        Dose:  1 tablet   Take 1 tablet by mouth daily   Refills:  0       OMEGA-3 FISH OIL PO        Dose:  1 g   Take 1 g by mouth daily   Refills:  0       sertraline 50 MG tablet   Commonly known as:  ZOLOFT   Used for:  Aggression, Schizoaffective disorder, unspecified type (H)        Dose:  150 mg   Take 3 tablets (150 mg) by mouth daily   Quantity:  90 tablet   Refills:  0         STOP taking     TOPAMAX PO           ziprasidone 20 MG capsule   Commonly known as:  GEODON                Where to get your medicines      These medications were sent to [facility] [place], [place] - [address]  [address] [address]     Phone:  [phone]     haloperidol 5 MG tablet    traZODone 50 MG tablet                Protect others around you: Learn how to safely use, store and throw away your medicines at www.disposemymeds.org.             Medication List: This is a list of all your medications and when to take them. Check marks below indicate your daily home schedule. Keep this list as a reference.      Medications           Morning Afternoon Evening Bedtime As Needed    haloperidol 5 MG tablet   Commonly known as:  HALDOL   Take 1 tablet (5 mg) by mouth every evening   Last time this was given:  5 mg on 6/3/2018  9:53 PM                                LISINOPRIL PO   Take 5 mg by mouth   Last time this was given:  5 mg on 6/4/2018  9:11 AM                                metFORMIN 500 MG 24 hr tablet   Commonly known as:  GLUCOPHAGE-XR   Take 500 mg by mouth daily (with breakfast)   Last time this was given:  500 mg on 6/4/2018  9:11 AM                                multivitamin, therapeutic Tabs tablet   Take 1 tablet by mouth daily   Last time this was given:  1 tablet on 6/4/2018  9:11 AM                                OMEGA-3 FISH OIL PO   Take 1 g by mouth daily   Last time this was  given:  1 g on 6/4/2018  9:11 AM                                sertraline 50 MG tablet   Commonly known as:  ZOLOFT   Take 3 tablets (150 mg) by mouth daily   Last time this was given:  150 mg on 6/4/2018  9:11 AM                                traZODone 50 MG tablet   Commonly known as:  DESYREL   Take 1 tablet (50 mg) by mouth nightly as needed for sleep   Last time this was given:  50 mg on 6/3/2018  9:54 PM

## 2018-06-02 LAB
ALBUMIN UR-MCNC: NEGATIVE MG/DL
AMORPH CRY #/AREA URNS HPF: ABNORMAL /HPF
APPEARANCE UR: ABNORMAL
BILIRUB UR QL STRIP: NEGATIVE
CHOLEST SERPL-MCNC: 147 MG/DL
COLOR UR AUTO: YELLOW
FOLATE SERPL-MCNC: 38.2 NG/ML
GLUCOSE UR STRIP-MCNC: NEGATIVE MG/DL
HCT VFR BLD AUTO: 46.6 % (ref 40–53)
HDLC SERPL-MCNC: 47 MG/DL
HGB UR QL STRIP: NEGATIVE
KETONES UR STRIP-MCNC: NEGATIVE MG/DL
LDLC SERPL CALC-MCNC: 84 MG/DL
LEUKOCYTE ESTERASE UR QL STRIP: NEGATIVE
MUCOUS THREADS #/AREA URNS LPF: PRESENT /LPF
NITRATE UR QL: NEGATIVE
NONHDLC SERPL-MCNC: 100 MG/DL
PH UR STRIP: 6.5 PH (ref 5–7)
RBC #/AREA URNS AUTO: 0 /HPF (ref 0–2)
SOURCE: ABNORMAL
SP GR UR STRIP: 1.02 (ref 1–1.03)
TRIGL SERPL-MCNC: 81 MG/DL
TSH SERPL DL<=0.005 MIU/L-ACNC: 2.7 MU/L (ref 0.4–4)
UROBILINOGEN UR STRIP-MCNC: NORMAL MG/DL (ref 0–2)
VIT B12 SERPL-MCNC: 1340 PG/ML (ref 193–986)
WBC #/AREA URNS AUTO: 0 /HPF (ref 0–5)

## 2018-06-02 PROCEDURE — A9270 NON-COVERED ITEM OR SERVICE: HCPCS | Mod: GY | Performed by: PSYCHIATRY & NEUROLOGY

## 2018-06-02 PROCEDURE — 81001 URINALYSIS AUTO W/SCOPE: CPT | Performed by: PSYCHIATRY & NEUROLOGY

## 2018-06-02 PROCEDURE — 36415 COLL VENOUS BLD VENIPUNCTURE: CPT | Performed by: PSYCHIATRY & NEUROLOGY

## 2018-06-02 PROCEDURE — 82746 ASSAY OF FOLIC ACID SERUM: CPT | Performed by: PSYCHIATRY & NEUROLOGY

## 2018-06-02 PROCEDURE — 85014 HEMATOCRIT: CPT | Performed by: PSYCHIATRY & NEUROLOGY

## 2018-06-02 PROCEDURE — A9270 NON-COVERED ITEM OR SERVICE: HCPCS | Mod: GY | Performed by: STUDENT IN AN ORGANIZED HEALTH CARE EDUCATION/TRAINING PROGRAM

## 2018-06-02 PROCEDURE — 84443 ASSAY THYROID STIM HORMONE: CPT | Performed by: PSYCHIATRY & NEUROLOGY

## 2018-06-02 PROCEDURE — 80061 LIPID PANEL: CPT | Performed by: PSYCHIATRY & NEUROLOGY

## 2018-06-02 PROCEDURE — 82607 VITAMIN B-12: CPT | Performed by: PSYCHIATRY & NEUROLOGY

## 2018-06-02 PROCEDURE — 25000132 ZZH RX MED GY IP 250 OP 250 PS 637: Mod: GY | Performed by: PSYCHIATRY & NEUROLOGY

## 2018-06-02 PROCEDURE — 25000132 ZZH RX MED GY IP 250 OP 250 PS 637: Mod: GY | Performed by: STUDENT IN AN ORGANIZED HEALTH CARE EDUCATION/TRAINING PROGRAM

## 2018-06-02 PROCEDURE — 12400007 ZZH R&B MH INTERMEDIATE UMMC

## 2018-06-02 RX ORDER — HALOPERIDOL 5 MG/1
5 TABLET ORAL EVERY EVENING
Status: DISCONTINUED | OUTPATIENT
Start: 2018-06-03 | End: 2018-06-04 | Stop reason: HOSPADM

## 2018-06-02 RX ORDER — HALOPERIDOL 2 MG/1
2 TABLET ORAL ONCE
Status: COMPLETED | OUTPATIENT
Start: 2018-06-02 | End: 2018-06-02

## 2018-06-02 RX ORDER — BENZTROPINE MESYLATE 0.5 MG/1
0.5 TABLET ORAL 2 TIMES DAILY PRN
Status: DISCONTINUED | OUTPATIENT
Start: 2018-06-02 | End: 2018-06-04 | Stop reason: HOSPADM

## 2018-06-02 RX ORDER — LORAZEPAM 2 MG/1
2 TABLET ORAL EVERY 4 HOURS PRN
Status: DISCONTINUED | OUTPATIENT
Start: 2018-06-02 | End: 2018-06-04 | Stop reason: HOSPADM

## 2018-06-02 RX ORDER — HALOPERIDOL 5 MG/1
5 TABLET ORAL EVERY 6 HOURS PRN
Status: DISCONTINUED | OUTPATIENT
Start: 2018-06-02 | End: 2018-06-04 | Stop reason: HOSPADM

## 2018-06-02 RX ADMIN — SERTRALINE HYDROCHLORIDE 150 MG: 50 TABLET ORAL at 09:54

## 2018-06-02 RX ADMIN — TOPIRAMATE 25 MG: 25 TABLET, FILM COATED ORAL at 21:31

## 2018-06-02 RX ADMIN — TRAZODONE HYDROCHLORIDE 50 MG: 50 TABLET ORAL at 21:43

## 2018-06-02 RX ADMIN — LISINOPRIL 5 MG: 2.5 TABLET ORAL at 09:53

## 2018-06-02 RX ADMIN — Medication 1 G: at 09:52

## 2018-06-02 RX ADMIN — HALOPERIDOL 5 MG: 5 TABLET ORAL at 21:45

## 2018-06-02 RX ADMIN — HALOPERIDOL 2 MG: 2 TABLET ORAL at 11:16

## 2018-06-02 RX ADMIN — HALOPERIDOL 5 MG: 5 TABLET ORAL at 14:55

## 2018-06-02 RX ADMIN — THERA TABS 1 TABLET: TAB at 09:54

## 2018-06-02 RX ADMIN — ZIPRASIDONE HCL 20 MG: 20 CAPSULE ORAL at 09:54

## 2018-06-02 RX ADMIN — LORAZEPAM 2 MG: 2 TABLET ORAL at 21:45

## 2018-06-02 RX ADMIN — METFORMIN HYDROCHLORIDE 500 MG: 500 TABLET, EXTENDED RELEASE ORAL at 09:53

## 2018-06-02 ASSESSMENT — ACTIVITIES OF DAILY LIVING (ADL)
GROOMING: INDEPENDENT
ORAL_HYGIENE: INDEPENDENT
DRESS: INDEPENDENT
GROOMING: PROMPTS

## 2018-06-02 NOTE — PROGRESS NOTES
Pt has not attended OT groups yet. He will be encouraged to attend groups and be provided a Self Assessment form.  OT staff will explain the value of OT, including them in their treatment plan and offer options to meet their needs and identify goals.

## 2018-06-02 NOTE — PROGRESS NOTES
"Initial Psychosocial Assessment    I have reviewed the chart, met with the patient, and developed Care Plan.      Presenting Problem:  Mr. Salas is a 28 y.o. male who presents today for evaluation of auditory hallucinations. Reportedly, he says that the \"voices are always there\". For the past 2 days, however they have been more prevalent per patient's report. He said that they don't tell him to harm himself and/or they don't exactly give him any directions. He said that they talk to him about people from the past and mythologies. Sometimes, he said: \"We get stuck on certain people and places\". More than anything he said that these voices are very frustrating to him. Patient denies feeling suicidal and/or homicidal. In part, he says that he knows that the voices are not real to others, but he said: \"They are real to me\". Patient said that he is eating ok but his sleep has been disturbed since the onset of the voices. Patient said that he had been off his medications for 4 weeks including the Safaris (sp?). Then, when he started again to be symptomatic as described above, he said that he was put on Geodon, 1 pill daily for a week and then 2 pills regularly after that. He said that the Geodon strangely enough is not helping his symptoms to decrease. He suspects that he needs a medication adjustment and hopes that the voices remit.\"    History of Mental Health and Chemical Dependency:  Mental health history:   Past Diagnoses: Schizophrenia, Schizoaffective Disorder  Past Hospitalizations: 8/2017 [facility], 11/2014, 5/2014, 1/2014 [facility], 2012 [facility]  Court Commitment: None  Past Suicide Attempt: None  Self-injurious Behavior: None  Chemical use history: Patient denies substance use history including tobacco, marijuana, methamphetamine, cocaine, heroin.  He states he is a social drinker and drinks an occasional beer here and there.  It should be noted that patient stated in \"previous methodologies\" he may have used any " "number of substances so it is unclear at this time if he truly has no past history of substance use.    Family Description (Constellation, Family Psychiatric History):  Parents  when he was in middle school. Patient reports his sister had an admission at some point with \"schizophrenic symptoms\" but improved and has not had a recurrence.    Significant Life Events (Illness, Abuse, Trauma, Death):  Patient denies any abuse/trauma or negative life events that may be impacting their mental health symptoms.     Living Situation:  Patient resides with his mother,2 sisters and a brother and half brother     Educational Background:  Did not graduate HS but was there through 12th grade.    Occupational History:  Worked at [employer] as an  per chart review (5/2014).     Financial Status:  Currently on disability    Legal Issues:  Patient denies     Ethnic/Cultural Issues:  None identified    Spiritual Orientation:  None identified     Service History:  Denies     Current Treatment Providers are:  Primary Outpatient Psychiatrist: Matt Tamayo MD; [facility]  Primary Physician: Sam Emmons, [facility]  Therapist: [facility] CM: Akhil Contreras [facility]  Probation/: None  Family: Leonor Salas, Mother ([phone])    Social Functioning:  Current psychosocial stressors include worsening disorganization and auditory hallucinations with poor sleep which has led to patient not leaving the house.    Social Service Assessment/Plan:  Patient has been admitted for psychiatric stabilization. Patient will have psychiatric assessment and medication management by the psychiatrist. Medications will be reviewed and adjusted per MD as indicated. The treatment team will continue to assess and stabilize the patient's mental health symptoms with the use of medications and therapeutic programming. Hospital staff will provide a safe environment and a therapeutic " milieu. Staff will continue to assess patient as needed. Patient will participate in unit groups and activities. Patient will receive individual and group support on the unit.  CTC will do individual inpatient treatment planning and after care planning. CTC will discuss options for increasing community supports with the patient. CTC will coordinate with outpatient providers and will place referrals to ensure appropriate follow up care is in place.  Patient would benefit from: Medication management
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
## 2018-06-02 NOTE — PROGRESS NOTES
Pt was isolative and withdrawn to his room and appears internally preoccupied. Pt endorses auditory hallucinations and appears responding. He expressed feeling anxious and depressed, mentioned having some thoughts of suicide but no plans. He was unable to contract for safety, he smiled oddly at staff when asked if he would be able to approach staff for help.     06/02/18 1418   Behavioral Health   Hallucinations auditory;appears responding   Thinking distractable;poor concentration   Orientation person: oriented;place: oriented;date: oriented   Insight poor   Judgement impaired   Eye Contact at examiner   Affect blunted, flat   Mood depressed;anxious   Physical Appearance/Attire attire appropriate to age and situation   Hygiene other (see comment)  (adequate )   Suicidality thoughts only   1. Wish to be Dead No   2. Non-Specific Active Suicidal Thoughts  No   Self Injury other (see comment)  (denies)   Elopement (No issues this shift )   Activity isolative;withdrawn   Speech clear;coherent   Medication Sensitivity no stated side effects;no observed side effects   Psychomotor / Gait balanced;steady   Activities of Daily Living   Hygiene/Grooming independent   Oral Hygiene independent   Dress independent   Room Organization independent
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
## 2018-06-02 NOTE — PLAN OF CARE
"Problem: Cognitive Impairment (Psychotic Signs/Symptoms) (Adult)  Goal: Improved Thought Clarity/Organization (Psychotic Signs/Symptoms)  Cosmo allowed writer to go over am medications with him.  On approach he smiled incongruently for the conversation.  He seemed to know his medications after a delay to think about question asked.  He said that he slept on and off last night.  When asked what brought him in here he said, \"Life present and past lives.  They are all real.  They are more (aggressive) now and that is why I'm here.  But it is all real.\".  He quit smiling, looked angry, stopped talking then abruptly got up from table to end conversation without reason.  He appears to be responding to internal stimulation.  That stimulation makes him sometimes smile with eyes looking around during conversation, and sometimes causes to become abruptly angry and dismissive.  Actions did not match conversation, situation.    1445 smiling when speaking, standing tall with hard look in eyes, then smiling again.  When asked said, \"I didn't know I was smiling.\".  He appeared strongly to be responding to internal stimulation that changes abruptly as his mood.  He accepted Haldol 5 mg prn as 2 mg dose made him a little sleepy, no stiffness but did not influence internal stim.  He was informed to tell staff if any muscle stiffness so we could medicate after assessing.  "

## 2018-06-02 NOTE — PROGRESS NOTES
06/01/18 1902   Patient Belongings   Did you bring any home meds/supplements to the hospital?  No   Patient Belongings clothing;money (see comment);shoes;wallet   Disposition of Belongings Locker;Sent to security per site process   Belongings Search Yes   Clothing Search Yes   Second Staff Maxwell GOLD       Items in Pt's locker:  -Shoes  -Socks  -Underwear  -t shirt  -Jeans  -Belt  -Wallet    Items sent to security:  -$20 (bill)  -[employer] 1007  -[employer] 1015  -Visa 9058  -Visa 8391  -Visa 9105  -Discover 0500    A               Admission:  I am responsible for any personal items that are not sent to the safe or pharmacy.  [facility] is not responsible for loss, theft or damage of any property in my possession.    Signature:  _________________________________ Date: _______  Time: _____                                              Staff Signature:  ____________________________ Date: ________  Time: _____      2nd Staff person, if patient is unable/unwilling to sign:    Signature: ________________________________ Date: ________  Time: _____     Discharge:  [facility] has returned all of my personal belongings:    Signature: _________________________________ Date: ________  Time: _____                                          Staff Signature:  ____________________________ Date: ________  Time: _____

## 2018-06-03 PROCEDURE — 25000132 ZZH RX MED GY IP 250 OP 250 PS 637: Mod: GY | Performed by: PSYCHIATRY & NEUROLOGY

## 2018-06-03 PROCEDURE — A9270 NON-COVERED ITEM OR SERVICE: HCPCS | Mod: GY | Performed by: PSYCHIATRY & NEUROLOGY

## 2018-06-03 PROCEDURE — 12400007 ZZH R&B MH INTERMEDIATE UMMC

## 2018-06-03 PROCEDURE — A9270 NON-COVERED ITEM OR SERVICE: HCPCS | Mod: GY | Performed by: STUDENT IN AN ORGANIZED HEALTH CARE EDUCATION/TRAINING PROGRAM

## 2018-06-03 PROCEDURE — 25000132 ZZH RX MED GY IP 250 OP 250 PS 637: Mod: GY | Performed by: STUDENT IN AN ORGANIZED HEALTH CARE EDUCATION/TRAINING PROGRAM

## 2018-06-03 RX ADMIN — Medication 1 G: at 08:13

## 2018-06-03 RX ADMIN — TOPIRAMATE 25 MG: 25 TABLET, FILM COATED ORAL at 21:53

## 2018-06-03 RX ADMIN — SERTRALINE HYDROCHLORIDE 150 MG: 50 TABLET ORAL at 08:14

## 2018-06-03 RX ADMIN — HALOPERIDOL 5 MG: 5 TABLET ORAL at 14:17

## 2018-06-03 RX ADMIN — LISINOPRIL 5 MG: 2.5 TABLET ORAL at 08:13

## 2018-06-03 RX ADMIN — HALOPERIDOL 5 MG: 5 TABLET ORAL at 21:53

## 2018-06-03 RX ADMIN — TRAZODONE HYDROCHLORIDE 50 MG: 50 TABLET ORAL at 21:54

## 2018-06-03 RX ADMIN — THERA TABS 1 TABLET: TAB at 08:14

## 2018-06-03 RX ADMIN — METFORMIN HYDROCHLORIDE 500 MG: 500 TABLET, EXTENDED RELEASE ORAL at 08:14

## 2018-06-03 ASSESSMENT — ACTIVITIES OF DAILY LIVING (ADL)
DRESS: INDEPENDENT
LAUNDRY: WITH SUPERVISION
GROOMING: INDEPENDENT
LAUNDRY: WITH SUPERVISION
DRESS: INDEPENDENT
GROOMING: INDEPENDENT
ORAL_HYGIENE: INDEPENDENT
ORAL_HYGIENE: INDEPENDENT

## 2018-06-03 NOTE — PROGRESS NOTES
06/02/18 2030   Behavioral Health   Hallucinations appears responding   Thinking paranoid   Insight poor   Judgement impaired   Eye Contact at examiner   Affect tense   Mood anxious   Physical Appearance/Attire disheveled   Activity isolative   Psycho Education   Type of Intervention 1:1 intervention   Response refuses   Activities of Daily Living   Hygiene/Grooming prompts   Pt is isolative to self spending most time lying in bed. Pt affect is odd, he stares and is verbally non responsive to questions. Pt is visible during meals and returns to room. Pt states no concerns to this writer.

## 2018-06-03 NOTE — PLAN OF CARE
Problem: Cognitive Impairment (Psychotic Signs/Symptoms) (Adult)  Goal: Improved Thought Clarity/Organization (Psychotic Signs/Symptoms)  Cosmo was up early watching tv and quietly observant.  He took his meds when offered readily.  He said that he slept well.  He had an almost smile, the corners of his mouth twitching upward for seconds a few times while talking with staff.  Eye contact direct and present 50% time.  His response is delayed by about 5 second, he then answers related to question asked.  When asked a question, he breaks eye contact, looks away, and looks disconnected for about 5 seconds then answers question as mentioned.  He has had no stand up abruptly and walk away episodes yet today (currently  0930).  He did not look angry, but looked calm, facial muscles relaxed and only the twitch like smile fleetingly.    1400 Cosmo was encouraged to ask for prn if he thought he needed it.  20 minutes later he told another staff he was hearing voices and needed it.  He advocated for himself and assessed in himself a symptom worthy of prn.  Given Haldol 5 mg.  He has other prn's available if this does not help.  HIs eyes are quite distant, hard to focus in on attention to simple question answer cycle.  He denied stiff muscles of any kind.

## 2018-06-04 VITALS
HEIGHT: 75 IN | DIASTOLIC BLOOD PRESSURE: 83 MMHG | SYSTOLIC BLOOD PRESSURE: 133 MMHG | RESPIRATION RATE: 16 BRPM | BODY MASS INDEX: 37.18 KG/M2 | WEIGHT: 299 LBS | TEMPERATURE: 98.2 F | HEART RATE: 81 BPM

## 2018-06-04 PROCEDURE — 25000132 ZZH RX MED GY IP 250 OP 250 PS 637: Mod: GY | Performed by: PSYCHIATRY & NEUROLOGY

## 2018-06-04 PROCEDURE — 99238 HOSP IP/OBS DSCHRG MGMT 30/<: CPT | Mod: GC | Performed by: PSYCHIATRY & NEUROLOGY

## 2018-06-04 PROCEDURE — A9270 NON-COVERED ITEM OR SERVICE: HCPCS | Mod: GY | Performed by: STUDENT IN AN ORGANIZED HEALTH CARE EDUCATION/TRAINING PROGRAM

## 2018-06-04 PROCEDURE — A9270 NON-COVERED ITEM OR SERVICE: HCPCS | Mod: GY | Performed by: PSYCHIATRY & NEUROLOGY

## 2018-06-04 PROCEDURE — 25000132 ZZH RX MED GY IP 250 OP 250 PS 637: Mod: GY | Performed by: STUDENT IN AN ORGANIZED HEALTH CARE EDUCATION/TRAINING PROGRAM

## 2018-06-04 RX ORDER — HALOPERIDOL 5 MG/1
5 TABLET ORAL EVERY EVENING
Qty: 30 TABLET | Refills: 0 | Status: SHIPPED | OUTPATIENT
Start: 2018-06-04

## 2018-06-04 RX ORDER — TRAZODONE HYDROCHLORIDE 50 MG/1
50 TABLET, FILM COATED ORAL
Qty: 30 TABLET | Refills: 0 | Status: SHIPPED | OUTPATIENT
Start: 2018-06-04

## 2018-06-04 RX ADMIN — THERA TABS 1 TABLET: TAB at 09:11

## 2018-06-04 RX ADMIN — LISINOPRIL 5 MG: 2.5 TABLET ORAL at 09:11

## 2018-06-04 RX ADMIN — METFORMIN HYDROCHLORIDE 500 MG: 500 TABLET, EXTENDED RELEASE ORAL at 09:11

## 2018-06-04 RX ADMIN — SERTRALINE HYDROCHLORIDE 150 MG: 50 TABLET ORAL at 09:11

## 2018-06-04 RX ADMIN — Medication 1 G: at 09:11

## 2018-06-04 ASSESSMENT — ACTIVITIES OF DAILY LIVING (ADL)
DRESS: INDEPENDENT
GROOMING: INDEPENDENT
ORAL_HYGIENE: INDEPENDENT
LAUNDRY: WITH SUPERVISION

## 2018-06-04 NOTE — DISCHARGE INSTRUCTIONS
Behavioral Discharge Planning and Instructions      Summary:  You were admitted on 6/1/2018  due to Disorganized Thinking/Behaviors and Psychotic Symptomology.  You were treated by Dr. Angel Moore MD and discharged on 6/4/18 from Station [facility] to Home. You are being discharged Against Medical Advice because the treatment team would prefer you stay longer for continued stabilization.        Principal Diagnosis: Schizophrenia      Health Care Follow-up Appointments:   Matt Tamayo MD; Thursday June 14 @ 3:20pm  [facility] [address] [place], [place]  [phone]  Health Unit Coordinator has faxed discharge summary and instructions to [phone].    Primary Physician: Sam Emmons, [facility]  Therapist: [facility] worker  County CM: Josefina Villegas    Attend all scheduled appointments with your outpatient providers. Call at least 24 hours in advance if you need to reschedule an appointment to ensure continued access to your outpatient providers.   Major Treatments, Procedures and Findings:  You were provided with: a psychiatric assessment, assessed for medical stability, medication evaluation and/or management and group therapy    Symptoms to Report: feeling more aggressive, increased confusion, losing more sleep, mood getting worse or thoughts of suicide    Early warning signs can include: increased depression or anxiety sleep disturbances increased thoughts or behaviors of suicide or self-harm  increased unusual thinking, such as paranoia or hearing voices    Safety and Wellness:  Take all medicines as directed.  Make no changes unless your doctor suggests them.      Follow treatment recommendations.  Refrain from alcohol and non-prescribed drugs.  If there is a concern for safety, call 911.    Resources:   Crisis Intervention: 681.437.6738 or 819-621-1567 (TTY: 244.362.2890).  Call anytime for help.  [employer] [place] [employer] Mental Illness (www.[place].bobby.org): 628.549.1947 or  "802.873.8821.  Mental Health Consumer/Survivor Network of [place] (www.mhcsn.net): 110.692.8785 or 175-004-4038  Mental Health Association of [place] (www.mentalhealth.org): 647.556.3354 or 303-266-9490  [facility] Crisis (COPE) Response - Adult 444 891-0951  Text 4 Life: txt \"LIFE\" to 64078 for immediate support and crisis intervention  Crisis text line: Text \"[place]\" to 330672. Free, confidential, 24/7.    The treatment team has appreciated the opportunity to work with you.     If you have any questions or concerns our unit number is [phone].  You may be receiving a follow-up phone call within the next three days from a representative from behavioral health.    You have identified the best phone number to reach you as [phone]        "

## 2018-06-04 NOTE — PLAN OF CARE
Problem: Cognitive Impairment (Psychotic Signs/Symptoms) (Adult)  Goal: Improved Thought Clarity/Organization (Psychotic Signs/Symptoms)  Outcome: Adequate for Discharge Date Met: 06/04/18  Cosmo discharged AMSHEREEN [id number] care of brother-reviewed and signed DC AMA release form-reviewed medication schedule and outpt tx plan and verbalized understanding-

## 2018-06-04 NOTE — PLAN OF CARE
Problem: Patient Care Overview  Goal: Team Discussion  Team Plan:    BEHAVIORAL TEAM DISCUSSION    Participants: Angel Moore MD; Eloy Small MD; Maggi BROWNING Medical student; Aissatou Tim RN; Ashia CAPPS; Shaye You OT  Progress: improving.  Continued Stay Criteria/Rationale: Patient was newly admitted over the weekend due to auditory hallucinations and disorganized thinking. Evaluation in process.  Medical/Physical: hypertension  Precautions:   Behavioral Orders   Procedures     Assault precautions     Code 1 - Restrict to Unit     Routine Programming     As clinically indicated     Status 15     Every 15 minutes.     Suicide precautions     Patients on Suicide Precautions should have a Combination Diet ordered that includes a Diet selection(s) AND a Behavioral Tray selection for Safe Tray - with utensils, or Safe Tray - NO utensils       Plan: Patient agreed with medication changes on the unit. He has stabilized and is requesting discharge. Patient will be discharged today if collateral contacts are in agreement.    Rationale for change in precautions or plan: patient has improved enough to discharge.

## 2018-06-04 NOTE — DISCHARGE SUMMARY
"    ----------------------------------------------------------------------------------------------------------  [facility], [place]   Discharge Summary  Hospital Day #3      Cosmo Salas MRN# [id number]   Age: 28 year old YOB: 1990     Date of Admission:  6/1/2018  Date of Discharge:  6/4/2018  Admitting Physician:  Angel Moore MD  Discharge Physician:  Angel Moore MD         Event Leading to Hospitalization:   Cosmo Salas is a 28 year old  male with a significant past psychiatric history of  schizophrenia who presents with difficulty sleeping and increased disorganization.     From [facility] ED Note: \"Mr. Salas is a 28 y.o. male who presents today for evaluation of auditory hallucinations. Reportedly, he says that the \"voices are always there\". For the past 2 days, however they have been more prevalent per patient's report. He said that they don't tell him to harm himself and/or they don't exactly give him any directions. He said that they talk to him about people from the past and mythologies. Sometimes, he said: \"We get stuck on certain people and places\". More than anything he said that these voices are very frustrating to him. Patient denies feeling suicidal and/or homicidal. In part, he says that he knows that the voices are not real to others, but he said: \"They are real to me\". Patient said that he is eating ok but his sleep has been disturbed since the onset of the voices. Patient said that he had been off his medications for 4 weeks including the Safaris (sp?). Then, when he started again to be symptomatic as described above, he said that he was put on Geodon, 1 pill daily for a week and then 2 pills regularly after that. He said that the Geodon strangely enough is not helping his symptoms to decrease. He suspects that he needs a medication adjustment and hopes that the voices remit.\"     Patient Interview: Patient is extremely difficult historian " "due to significant disorganization.  He has been unable to sleep for the past 3 days.  He states his symptoms have gotten progressively worse over the past 4 weeks since he was started on Geodon.  Previous to this per chart review he appears to have been on Invega and this was apparently discontinued prior to initiation of Geodon.  Prior to starting Geodon he was off all his psychotropic medications for some period of time, possibly a month.  Auditory hallucinations began to worsen about a week ago, are multiple in number and apparently talked to him about the events that have happened in his past as well as additional \"mythologies\" that appear to be to patient's understanding alternative lives he has led.  He also reports that in the weeks leading up to the voices worsening he has felt increasingly \"spacey\" which he thinks may be due to Topamax.  Patient further endorses increasing anxiety and frustration due to inability to sleep and worsened auditory hallucinations.  He denies paranoia at this time however and felt safe both at home and in the hospital.  He does endorse thought broadcasting and thought insertion, but is unclear if this is a long-standing belief for him or acutely worsened in the current episode.  Patient further endorses worsening disinterest and normal activities that he enjoys but does not feel hopeless or helpless.  Energy level has been somewhat increased in the sense that he is not sleeping, and his appetite is normal.  He denies any suicidal ideation, has not engaged in self-injurious behaviors and he does not have any thoughts of hurting other people.     Cosmo Salas's goals of this hospitalization are to get some sleep.       See Admission note by Angel Moore MD on 6/1/2018 for additional details.          Diagnoses:   # Schizophrenia         Consults:     None         Hospital Course:   Psychiatric Course:  Cosmo Salas was admitted to [facility] with attending Angel Moore MD as " a voluntary patient on Friday 6/1. PTA medication were initially restarted. On day after admission, Geodon was discontinued due to inefficacy, and patient was started on Haldol. Over the weekend, patient felt that this medication was helpful and reported that he was able to sleep better. He continued to experience some non-command auditory hallucinations, but felt they were quieter. Topomax was discontinued as patient felt his appetite was under control and felt that the medication had contributed to cognitive dulling. Patient requested to be discharged on Monday, stating that he was more comfortable with being home. He was encouraged to stay longer for further titration of Haldol to fully treat the hallucinations, but patient declined this. He denied having any suicidal or homicidal ideations. Patient agreed to call 911 or to present to the ED for evaluation if he does develop these thoughts.     While patient would have benefited from remaining in the hospital for medication adjustment, he did not appear to be in imminent danger of harm to self or others. Patient's mother was contacted, and she did not have any acute safety concerns. As patient did not meet criteria for a 72 hour hold, he was discharged home against medical advice.     Risk Assessment:  Cosmo Salas has notable risk factors for self-harm, including single status and psychosis. However, risk is mitigated by absence of past attempts and ability to volunteer a safety plan. Additional steps taken to minimize risk include: arranging outpatient mental health follow-up. Therefore, based on all available evidence including the factors cited above, Cosmo Salas does not appear to be at imminent risk for self-harm, and is appropriate for outpatient level of care.     This document serves as a transfer of care to Cosmo Salas's outpatient providers.         Discharge Medications:     Current Discharge Medication List      START taking these medications     Details   haloperidol (HALDOL) 5 MG tablet Take 1 tablet (5 mg) by mouth every evening  Qty: 30 tablet, Refills: 0    Associated Diagnoses: Schizophrenia, unspecified type (H)      traZODone (DESYREL) 50 MG tablet Take 1 tablet (50 mg) by mouth nightly as needed for sleep  Qty: 30 tablet, Refills: 0    Associated Diagnoses: Schizophrenia, unspecified type (H)         CONTINUE these medications which have NOT CHANGED    Details   LISINOPRIL PO Take 5 mg by mouth      metFORMIN (GLUCOPHAGE-XR) 500 MG 24 hr tablet Take 500 mg by mouth daily (with breakfast)      multivitamin, therapeutic (THERA-VIT) TABS Take 1 tablet by mouth daily      Omega-3 Fatty Acids (OMEGA-3 FISH OIL PO) Take 1 g by mouth daily      sertraline (ZOLOFT) 50 MG tablet Take 3 tablets (150 mg) by mouth daily  Qty: 90 tablet, Refills: 0    Associated Diagnoses: Aggression; Schizoaffective disorder, unspecified type (H)         STOP taking these medications       Topiramate (TOPAMAX PO) Comments:   Reason for Stopping:         ziprasidone (GEODON) 20 MG capsule Comments:   Reason for Stopping:             Rationale for Discontinued Medications  - Geodon : ineffective  - Topomax: cognitive dulling, and no longer needed for appetite suppression with change to Haldol         Psychiatric Examination:   Appearance:  awake, alert, adequately groomed and lying in bed  Attitude:  calm and pleasant  Eye Contact:  fair  Mood:  good  Affect:  mood congruent and smiling appropriately  Speech:  clear, coherent and normal prosody  Psychomotor Behavior:  no evidence of tardive dyskinesia, dystonia, or tics  Thought Process:  goal-oriented to discharge; did not appear disorganized  Associations:  no loose associations  Thought Content:  no evidence of suicidal ideation or homicidal ideation and reports mild AH, improved from admission  Insight:  fair  Judgment:  limited  Oriented to:  time, person, and place  Attention Span and Concentration:  intact  Recent and  Remote Memory:  intact  Language: Able to name objects, Able to repeat phrases and Able to read and write  Fund of Knowledge: appropriate  Muscle Strength and Tone: normal  Gait and Station: Normal         Discharge Plan:   Health Care Follow-up Appointments:   Matt Tamayo MD;  @ 3:20pm  [facility] [address] [place], [place]  [phone]  Health Unit Coordinator has faxed discharge summary and instructions to [phone].     Attend all scheduled appointments with your outpatient providers. Call at least 24 hours in advance if you need to reschedule an appointment to ensure continued access to your outpatient providers.   Pt seen and discussed with my attending, Dr. Teresa Small MD  PGY-2 Resident  Pager: [phone]        Attestation:   The patient has been seen and evaluated by me,  Angel Moore. I have examined the patient today and reviewed the discharge plan with the resident and medical student. I agree with the final assessment and plan, as noted in the discharge summary. I have reviewed today's vital signs, medications, labs and imaging.  Total time discharge plannin minutes  Angel Moore MD            Appendix A: All Labs This Admission:     Results for orders placed or performed during the hospital encounter of 18   UA with Microscopic reflex to Culture   Result Value Ref Range    Color Urine Yellow     Appearance Urine Slightly Cloudy     Glucose Urine Negative NEG^Negative mg/dL    Bilirubin Urine Negative NEG^Negative    Ketones Urine Negative NEG^Negative mg/dL    Specific Gravity Urine 1.018 1.003 - 1.035    Blood Urine Negative NEG^Negative    pH Urine 6.5 5.0 - 7.0 pH    Protein Albumin Urine Negative NEG^Negative mg/dL    Urobilinogen mg/dL Normal 0.0 - 2.0 mg/dL    Nitrite Urine Negative NEG^Negative    Leukocyte Esterase Urine Negative NEG^Negative    Source Unspecified Urine     WBC Urine 0 0 - 5 /HPF    RBC Urine 0 0 - 2  /HPF    Mucous Urine Present (A) NEG^Negative /LPF    Amorphous Crystals Few (A) NEG^Negative /HPF   Lipid panel   Result Value Ref Range    Cholesterol 147 <200 mg/dL    Triglycerides 81 <150 mg/dL    HDL Cholesterol 47 >39 mg/dL    LDL Cholesterol Calculated 84 <100 mg/dL    Non HDL Cholesterol 100 <130 mg/dL   TSH with free T4 reflex and/or T3 as indicated   Result Value Ref Range    TSH 2.70 0.40 - 4.00 mU/L   Folate   Result Value Ref Range    Folate 38.2 >5.4 ng/mL   Hematocrit   Result Value Ref Range    Hematocrit 46.6 40.0 - 53.0 %   Vitamin B12   Result Value Ref Range    Vitamin B12 1340 (H) 193 - 986 pg/mL

## 2020-03-02 ENCOUNTER — HEALTH MAINTENANCE LETTER (OUTPATIENT)
Age: 30
End: 2020-03-02

## 2020-12-20 ENCOUNTER — HEALTH MAINTENANCE LETTER (OUTPATIENT)
Age: 30
End: 2020-12-20

## 2021-04-24 ENCOUNTER — HEALTH MAINTENANCE LETTER (OUTPATIENT)
Age: 31
End: 2021-04-24

## 2021-10-03 ENCOUNTER — HEALTH MAINTENANCE LETTER (OUTPATIENT)
Age: 31
End: 2021-10-03

## 2022-05-15 ENCOUNTER — HEALTH MAINTENANCE LETTER (OUTPATIENT)
Age: 32
End: 2022-05-15

## 2022-09-10 ENCOUNTER — HEALTH MAINTENANCE LETTER (OUTPATIENT)
Age: 32
End: 2022-09-10

## 2023-06-03 ENCOUNTER — HEALTH MAINTENANCE LETTER (OUTPATIENT)
Age: 33
End: 2023-06-03